# Patient Record
Sex: FEMALE | Race: WHITE | NOT HISPANIC OR LATINO | URBAN - METROPOLITAN AREA
[De-identification: names, ages, dates, MRNs, and addresses within clinical notes are randomized per-mention and may not be internally consistent; named-entity substitution may affect disease eponyms.]

---

## 2023-05-25 ENCOUNTER — APPOINTMENT (OUTPATIENT)
Dept: PRIMARY CARE | Facility: CLINIC | Age: 38
End: 2023-05-25
Payer: COMMERCIAL

## 2024-06-26 ENCOUNTER — APPOINTMENT (OUTPATIENT)
Dept: PRIMARY CARE | Facility: CLINIC | Age: 39
End: 2024-06-26
Payer: COMMERCIAL

## 2024-06-26 VITALS
BODY MASS INDEX: 43.82 KG/M2 | HEIGHT: 65 IN | WEIGHT: 263 LBS | HEART RATE: 86 BPM | SYSTOLIC BLOOD PRESSURE: 120 MMHG | OXYGEN SATURATION: 98 % | DIASTOLIC BLOOD PRESSURE: 80 MMHG

## 2024-06-26 DIAGNOSIS — G47.30 SLEEP APNEA, UNSPECIFIED TYPE: ICD-10-CM

## 2024-06-26 DIAGNOSIS — R51.9 WORSENING HEADACHES: ICD-10-CM

## 2024-06-26 DIAGNOSIS — R51.9 ACHING HEADACHE: ICD-10-CM

## 2024-06-26 DIAGNOSIS — Z00.00 ENCOUNTER FOR ANNUAL HEALTH EXAMINATION: ICD-10-CM

## 2024-06-26 DIAGNOSIS — G43.709 CHRONIC MIGRAINE WITHOUT AURA WITHOUT STATUS MIGRAINOSUS, NOT INTRACTABLE: ICD-10-CM

## 2024-06-26 DIAGNOSIS — R06.83 SNORING: ICD-10-CM

## 2024-06-26 DIAGNOSIS — T78.40XA ALLERGY, INITIAL ENCOUNTER: ICD-10-CM

## 2024-06-26 DIAGNOSIS — H53.9 VISUAL CHANGES: ICD-10-CM

## 2024-06-26 DIAGNOSIS — R40.0 HAS DAYTIME DROWSINESS: ICD-10-CM

## 2024-06-26 DIAGNOSIS — Z01.419 ENCOUNTER FOR GYNECOLOGICAL EXAMINATION: Primary | ICD-10-CM

## 2024-06-26 LAB
25(OH)D3 SERPL-MCNC: 23 NG/ML (ref 30–100)
ALBUMIN SERPL BCP-MCNC: 4.6 G/DL (ref 3.4–5)
ALP SERPL-CCNC: 85 U/L (ref 33–110)
ALT SERPL W P-5'-P-CCNC: 70 U/L (ref 7–45)
ANION GAP SERPL CALC-SCNC: 15 MMOL/L (ref 10–20)
AST SERPL W P-5'-P-CCNC: 33 U/L (ref 9–39)
BILIRUB SERPL-MCNC: 0.6 MG/DL (ref 0–1.2)
BUN SERPL-MCNC: 14 MG/DL (ref 6–23)
CALCIUM SERPL-MCNC: 9.3 MG/DL (ref 8.6–10.6)
CHLORIDE SERPL-SCNC: 102 MMOL/L (ref 98–107)
CHOLEST SERPL-MCNC: 180 MG/DL (ref 0–199)
CHOLESTEROL/HDL RATIO: 4.2
CO2 SERPL-SCNC: 23 MMOL/L (ref 21–32)
CREAT SERPL-MCNC: 0.71 MG/DL (ref 0.5–1.05)
EGFRCR SERPLBLD CKD-EPI 2021: >90 ML/MIN/1.73M*2
ERYTHROCYTE [DISTWIDTH] IN BLOOD BY AUTOMATED COUNT: 13.7 % (ref 11.5–14.5)
EST. AVERAGE GLUCOSE BLD GHB EST-MCNC: 105 MG/DL
GLUCOSE SERPL-MCNC: 79 MG/DL (ref 74–99)
HBA1C MFR BLD: 5.3 %
HCT VFR BLD AUTO: 41 % (ref 36–46)
HDLC SERPL-MCNC: 42.8 MG/DL
HGB BLD-MCNC: 13.4 G/DL (ref 12–16)
LDLC SERPL CALC-MCNC: 110 MG/DL
MAGNESIUM SERPL-MCNC: 2.18 MG/DL (ref 1.6–2.4)
MCH RBC QN AUTO: 28.3 PG (ref 26–34)
MCHC RBC AUTO-ENTMCNC: 32.7 G/DL (ref 32–36)
MCV RBC AUTO: 87 FL (ref 80–100)
NON HDL CHOLESTEROL: 137 MG/DL (ref 0–149)
NRBC BLD-RTO: 0 /100 WBCS (ref 0–0)
PHOSPHATE SERPL-MCNC: 3.3 MG/DL (ref 2.5–4.9)
PLATELET # BLD AUTO: 213 X10*3/UL (ref 150–450)
POTASSIUM SERPL-SCNC: 4 MMOL/L (ref 3.5–5.3)
PROT SERPL-MCNC: 7.3 G/DL (ref 6.4–8.2)
RBC # BLD AUTO: 4.73 X10*6/UL (ref 4–5.2)
SODIUM SERPL-SCNC: 136 MMOL/L (ref 136–145)
T3 SERPL-MCNC: 112 NG/DL (ref 60–200)
T4 SERPL-MCNC: 7.7 UG/DL (ref 4.5–11.1)
TRIGL SERPL-MCNC: 136 MG/DL (ref 0–149)
TSH SERPL-ACNC: 2.83 MIU/L (ref 0.44–3.98)
VLDL: 27 MG/DL (ref 0–40)
WBC # BLD AUTO: 9.5 X10*3/UL (ref 4.4–11.3)

## 2024-06-26 PROCEDURE — 80061 LIPID PANEL: CPT

## 2024-06-26 PROCEDURE — 84480 ASSAY TRIIODOTHYRONINE (T3): CPT

## 2024-06-26 PROCEDURE — 36415 COLL VENOUS BLD VENIPUNCTURE: CPT

## 2024-06-26 PROCEDURE — 82306 VITAMIN D 25 HYDROXY: CPT

## 2024-06-26 PROCEDURE — 80053 COMPREHEN METABOLIC PANEL: CPT

## 2024-06-26 PROCEDURE — 83735 ASSAY OF MAGNESIUM: CPT

## 2024-06-26 PROCEDURE — 84100 ASSAY OF PHOSPHORUS: CPT

## 2024-06-26 PROCEDURE — 1036F TOBACCO NON-USER: CPT | Performed by: NURSE PRACTITIONER

## 2024-06-26 PROCEDURE — 85027 COMPLETE CBC AUTOMATED: CPT

## 2024-06-26 PROCEDURE — 83036 HEMOGLOBIN GLYCOSYLATED A1C: CPT

## 2024-06-26 PROCEDURE — 84436 ASSAY OF TOTAL THYROXINE: CPT

## 2024-06-26 PROCEDURE — 99385 PREV VISIT NEW AGE 18-39: CPT | Performed by: NURSE PRACTITIONER

## 2024-06-26 PROCEDURE — 84443 ASSAY THYROID STIM HORMONE: CPT

## 2024-06-26 RX ORDER — FLUTICASONE PROPIONATE 50 MCG
1 SPRAY, SUSPENSION (ML) NASAL DAILY
Qty: 16 G | Refills: 5 | Status: SHIPPED | OUTPATIENT
Start: 2024-06-26 | End: 2025-06-26

## 2024-06-26 RX ORDER — LORATADINE 10 MG/1
10 TABLET ORAL DAILY
Qty: 30 TABLET | Refills: 2 | Status: SHIPPED | OUTPATIENT
Start: 2024-06-26 | End: 2024-06-27

## 2024-06-26 RX ORDER — DEXAMETHASONE 4 MG/1
4 TABLET ORAL 2 TIMES DAILY
Qty: 8 TABLET | Refills: 0 | Status: SHIPPED | OUTPATIENT
Start: 2024-06-26 | End: 2024-06-30

## 2024-06-26 ASSESSMENT — ENCOUNTER SYMPTOMS: DEPRESSION: 0

## 2024-06-26 NOTE — PROGRESS NOTES
"Subjective   Patient ID: Stephanie Paige is a 39 y.o. female who presents for Establish Care, Headache, and Snoring.    HPI     Stephanie is here to [facility].    Patient would like to talk about worsening headaches.  She does experience migraines with aura aura at times.  No nausea or food sensitivities.  She has had them for several years.  They are getting worse.  Does complain of left ear pain pressure And congestion around her left eye.  No fevers or chills.  No problems with moving bowels or bladder.  Needs an eye exam soon.  Of note patient has had a consistent headache with aura for several days.  Should receive a CT scan of her brain for follow-up.    She also reports excessive daytime sleepiness.  Her  states that she snores loud.  She states her  also has noted that he is unsure if she has breathing or not during her sleep?  Should obtain sleep study for further insight.    Needs referral for GYN  Drinks/smokes occasionally.  Works as a  at the [employer].   no kids.      PMH: migraines   -- used to be on  tompax by previous neurologist: stopped     Past family hx:  - Grandmother: breast Ca( age 40s)  - Mother brain tumor       Review of Systems    Objective   /80 (BP Location: Left arm, Patient Position: Sitting, BP Cuff Size: Adult)   Pulse 86   Ht 1.64 m (5' 4.58\")   Wt 119 kg (263 lb)   SpO2 98%   BMI 44.34 kg/m²     Physical Exam    Assessment/Plan   1. Encounter for gynecological examination  - Referral to Gynecology; Future    2. Encounter for annual health examination  - Hemoglobin A1C  - CBC  - Comprehensive Metabolic Panel  - Lipid Panel  - TSH with reflex to Free T4 if abnormal  - Triiodothyronine, Total  - T4  - Vitamin D 25-Hydroxy,Total (for eval of Vitamin D levels)    3. Aching headache    - Vitamin D 25-Hydroxy,Total (for eval of Vitamin D levels)  - Phosphorus  - Magnesium  - CT head wo IV contrast; Future  - ubrogepant (Ubrelvy) 100 " mg tablet tablet; Take 1 tablet (100 mg) by mouth once daily as needed (migraines). LOT [id number]  EXP 01/2026  Dispense: 2 tablet; Refill: 0  - rimegepant (Nurtec ODT) 75 mg tablet,disintegrating; Take 1 tablet (75 mg) by mouth once daily as needed (migraines). LOT [id number]  EXP 03/2026  Dispense: 2 tablet; Refill: 0  - dexAMETHasone (Decadron) 4 mg tablet; Take 1 tablet (4 mg) by mouth 2 times a day for 4 days.  Dispense: 8 tablet; Refill: 0    4. Visual changes  - CT head wo IV contrast; Future    5. Worsening headaches  - CT head wo IV contrast; Future  - dexAMETHasone (Decadron) 4 mg tablet; Take 1 tablet (4 mg) by mouth 2 times a day for 4 days.  Dispense: 8 tablet; Refill: 0    6. Chronic migraine without aura without status migrainosus, not intractable    - ubrogepant (Ubrelvy) 100 mg tablet tablet; Take 1 tablet (100 mg) by mouth once daily as needed (migraines). LOT [id number]  EXP 01/2026  Dispense: 2 tablet; Refill: 0  - rimegepant (Nurtec ODT) 75 mg tablet,disintegrating; Take 1 tablet (75 mg) by mouth once daily as needed (migraines). LOT [id number]  EXP 03/2026  Dispense: 2 tablet; Refill: 0    7. Has daytime drowsiness  - Home sleep apnea test (HSAT); Future    8. Snoring  - Home sleep apnea test (HSAT); Future    9. Sleep apnea, unspecified type  - Home sleep apnea test (HSAT); Future    10. Allergy, initial encounter  - loratadine (Claritin) 10 mg tablet; Take 1 tablet (10 mg) by mouth once daily.  Dispense: 30 tablet; Refill: 2  - fluticasone (Flonase) 50 mcg/actuation nasal spray; Administer 1 spray into each nostril once daily. Shake gently. Before first use, prime pump. After use, clean tip and replace cap.  Dispense: 16 g; Refill: 5       1 month FU for headaches

## 2024-06-27 DIAGNOSIS — R74.01 ELEVATED AST (SGOT): Primary | ICD-10-CM

## 2024-06-27 DIAGNOSIS — E55.9 VITAMIN D DEFICIENCY: ICD-10-CM

## 2024-06-27 RX ORDER — ERGOCALCIFEROL 1.25 MG/1
50000 CAPSULE ORAL
Qty: 4 CAPSULE | Refills: 1 | Status: SHIPPED | OUTPATIENT
Start: 2024-06-30 | End: 2024-08-25

## 2024-06-27 RX ORDER — LORATADINE 10 MG/1
10 TABLET ORAL DAILY
Qty: 90 TABLET | Refills: 3 | Status: SHIPPED | OUTPATIENT
Start: 2024-06-27

## 2024-07-01 ENCOUNTER — HOSPITAL ENCOUNTER (OUTPATIENT)
Dept: RADIOLOGY | Facility: HOSPITAL | Age: 39
Discharge: HOME | End: 2024-07-01
Payer: COMMERCIAL

## 2024-07-01 DIAGNOSIS — R51.9 WORSENING HEADACHES: ICD-10-CM

## 2024-07-01 DIAGNOSIS — R51.9 ACHING HEADACHE: ICD-10-CM

## 2024-07-01 DIAGNOSIS — H53.9 VISUAL CHANGES: ICD-10-CM

## 2024-07-01 PROCEDURE — 70450 CT HEAD/BRAIN W/O DYE: CPT

## 2024-07-01 PROCEDURE — 70450 CT HEAD/BRAIN W/O DYE: CPT | Performed by: RADIOLOGY

## 2024-07-02 ENCOUNTER — APPOINTMENT (OUTPATIENT)
Dept: RADIOLOGY | Facility: HOSPITAL | Age: 39
End: 2024-07-02
Payer: COMMERCIAL

## 2024-07-02 DIAGNOSIS — R74.01 ELEVATED AST (SGOT): ICD-10-CM

## 2024-07-02 PROCEDURE — 76705 ECHO EXAM OF ABDOMEN: CPT

## 2024-07-02 PROCEDURE — 76705 ECHO EXAM OF ABDOMEN: CPT | Performed by: RADIOLOGY

## 2024-07-03 ENCOUNTER — TELEPHONE (OUTPATIENT)
Dept: PRIMARY CARE | Facility: CLINIC | Age: 39
End: 2024-07-03
Payer: COMMERCIAL

## 2024-07-03 NOTE — TELEPHONE ENCOUNTER
Result Communication    No results found from the In Basket message.    5:19 PM  Pt called by this NP   VM left  Mychart message sent as well to discuss US results

## 2024-07-29 ENCOUNTER — PROCEDURE VISIT (OUTPATIENT)
Dept: SLEEP MEDICINE | Facility: CLINIC | Age: 39
End: 2024-07-29
Payer: COMMERCIAL

## 2024-07-29 ENCOUNTER — APPOINTMENT (OUTPATIENT)
Dept: PRIMARY CARE | Facility: CLINIC | Age: 39
End: 2024-07-29
Payer: COMMERCIAL

## 2024-07-29 VITALS
BODY MASS INDEX: 45.41 KG/M2 | HEIGHT: 64 IN | DIASTOLIC BLOOD PRESSURE: 86 MMHG | HEART RATE: 101 BPM | WEIGHT: 266 LBS | SYSTOLIC BLOOD PRESSURE: 124 MMHG | OXYGEN SATURATION: 98 %

## 2024-07-29 DIAGNOSIS — R74.8 ELEVATED LIVER ENZYMES: ICD-10-CM

## 2024-07-29 DIAGNOSIS — R06.83 SNORING: ICD-10-CM

## 2024-07-29 DIAGNOSIS — G43.709 CHRONIC MIGRAINE WITHOUT AURA WITHOUT STATUS MIGRAINOSUS, NOT INTRACTABLE: Primary | ICD-10-CM

## 2024-07-29 DIAGNOSIS — G47.33 OBSTRUCTIVE SLEEP APNEA (ADULT) (PEDIATRIC): ICD-10-CM

## 2024-07-29 DIAGNOSIS — G47.30 SLEEP APNEA, UNSPECIFIED TYPE: ICD-10-CM

## 2024-07-29 DIAGNOSIS — R40.0 HAS DAYTIME DROWSINESS: ICD-10-CM

## 2024-07-29 DIAGNOSIS — R21 RASH: ICD-10-CM

## 2024-07-29 PROCEDURE — 1036F TOBACCO NON-USER: CPT | Performed by: NURSE PRACTITIONER

## 2024-07-29 PROCEDURE — 99213 OFFICE O/P EST LOW 20 MIN: CPT | Performed by: NURSE PRACTITIONER

## 2024-07-29 PROCEDURE — 95806 SLEEP STUDY UNATT&RESP EFFT: CPT | Performed by: STUDENT IN AN ORGANIZED HEALTH CARE EDUCATION/TRAINING PROGRAM

## 2024-07-29 PROCEDURE — 3008F BODY MASS INDEX DOCD: CPT | Performed by: NURSE PRACTITIONER

## 2024-07-29 RX ORDER — TOPIRAMATE 25 MG/1
TABLET ORAL
Qty: 100 TABLET | Refills: 5 | Status: SHIPPED | OUTPATIENT
Start: 2024-07-29

## 2024-07-29 RX ORDER — CLOBETASOL PROPIONATE 0.5 MG/G
CREAM TOPICAL 2 TIMES DAILY
Qty: 15 G | Refills: 0 | Status: SHIPPED | OUTPATIENT
Start: 2024-07-29 | End: 2025-03-26

## 2024-07-29 ASSESSMENT — ENCOUNTER SYMPTOMS
MUSCULOSKELETAL NEGATIVE: 1
POLYPHAGIA: 0
HEMATOLOGIC/LYMPHATIC NEGATIVE: 1
SLEEP DISTURBANCE: 0
ALLERGIC/IMMUNOLOGIC NEGATIVE: 1
CONSTITUTIONAL NEGATIVE: 1
CONFUSION: 0
RESPIRATORY NEGATIVE: 1
LIGHT-HEADEDNESS: 0
GASTROINTESTINAL NEGATIVE: 1
DEPRESSION: 0
FACIAL ASYMMETRY: 0
NERVOUS/ANXIOUS: 0
EYES NEGATIVE: 1
CARDIOVASCULAR NEGATIVE: 1
HEADACHES: 1
WOUND: 0
PSYCHIATRIC NEGATIVE: 1
DIZZINESS: 0
ENDOCRINE NEGATIVE: 1
WEAKNESS: 0

## 2024-07-29 NOTE — PROGRESS NOTES
"Subjective   Patient ID: Stephanie Paige is a 39 y.o. female who presents for Follow-up (1 month follow up for headaches, they have not changed still the same ).    HPI   Stephanie is here for headache follow up   Still complaints of migraines wouldl like to back on Tompax    Decadron worked   Working on low fat diet     Review of Systems   Constitutional: Negative.    HENT: Negative.     Eyes: Negative.    Respiratory: Negative.     Cardiovascular: Negative.    Gastrointestinal: Negative.    Endocrine: Negative.  Negative for polyphagia.   Genitourinary: Negative.    Musculoskeletal: Negative.    Skin: Negative.  Negative for pallor, rash and wound.   Allergic/Immunologic: Negative.    Neurological:  Positive for headaches. Negative for dizziness, facial asymmetry, weakness and light-headedness.   Hematological: Negative.    Psychiatric/Behavioral: Negative.  Negative for confusion, sleep disturbance and suicidal ideas. The patient is not nervous/anxious.    All other systems reviewed and are negative.      Objective   /86   Pulse 101   Ht 1.626 m (5' 4\")   Wt 121 kg (266 lb)   SpO2 98%   BMI 45.66 kg/m²     Physical Exam  Vitals and nursing note reviewed.   Constitutional:       Appearance: Normal appearance. She is normal weight.   HENT:      Head: Normocephalic.      Nose: Nose normal.      Mouth/Throat:      Mouth: Mucous membranes are moist.   Eyes:      Extraocular Movements: Extraocular movements intact.      Conjunctiva/sclera: Conjunctivae normal.      Pupils: Pupils are equal, round, and reactive to light.   Cardiovascular:      Rate and Rhythm: Normal rate and regular rhythm.      Pulses: Normal pulses.      Heart sounds: Normal heart sounds.   Pulmonary:      Effort: Pulmonary effort is normal.   Abdominal:      General: Abdomen is flat. Bowel sounds are normal.      Palpations: Abdomen is soft.   Musculoskeletal:         General: Normal range of motion.      Cervical back: Normal range of " motion.   Skin:     General: Skin is warm and dry.   Neurological:      General: No focal deficit present.      Mental Status: She is alert and oriented to person, place, and time.      Cranial Nerves: No cranial nerve deficit.      Sensory: No sensory deficit.      Motor: No weakness.      Coordination: Coordination normal.      Gait: Gait normal.      Deep Tendon Reflexes: Reflexes normal.   Psychiatric:         Mood and Affect: Mood normal.         Behavior: Behavior normal.         Assessment/Plan   1. Chronic migraine without aura without status migrainosus, not intractable  - topiramate (Topamax) 25 mg tablet; Take 25 mg daily x 7 days, Then increase 25 mg two times daily x 7 days ,Then increase to 50 mg two times daily  Dispense: 100 tablet; Refill: 5    2. Rash    - clobetasol (Temovate) 0.05 % cream; Apply topically 2 times a day.  Dispense: 15 g; Refill: 0    3. Elevated liver enzymes  - Comprehensive metabolic panel; Future           Yearly exams or sooner if problems arise

## 2024-07-29 NOTE — PROGRESS NOTES
Type of Study: HOME SLEEP STUDY - NOMAD     The patient received equipment and instructions for use of the [facility] Nomad HSAT device. The patient was instructed how to apply the effort belts, cannula, thermistor. It was also explained how the Nomad and oximeter components work.  The patient was asked to record their sleep for an 8-hour period.     The patient was informed of their responsibility for the device and acknowledged this by signing the HSAT device contract. The patient was asked to return the device on 07/30/2024 between the hours of 06:30 am - 06:30 pm to the [facility].     The patient was instructed to call 911 as usual for any medical- emergencies while at home.  The patient was also given a phone number for troubleshooting when using the device in case there were additional questions.

## 2024-09-16 ENCOUNTER — LAB (OUTPATIENT)
Dept: LAB | Facility: LAB | Age: 39
End: 2024-09-16
Payer: COMMERCIAL

## 2024-09-16 DIAGNOSIS — R74.8 ELEVATED LIVER ENZYMES: ICD-10-CM

## 2024-09-16 LAB
ALBUMIN SERPL BCP-MCNC: 4.3 G/DL (ref 3.4–5)
ALP SERPL-CCNC: 75 U/L (ref 33–110)
ALT SERPL W P-5'-P-CCNC: 48 U/L (ref 7–45)
ANION GAP SERPL CALC-SCNC: 10 MMOL/L (ref 10–20)
AST SERPL W P-5'-P-CCNC: 22 U/L (ref 9–39)
BILIRUB SERPL-MCNC: 0.4 MG/DL (ref 0–1.2)
BUN SERPL-MCNC: 18 MG/DL (ref 6–23)
CALCIUM SERPL-MCNC: 9 MG/DL (ref 8.6–10.3)
CHLORIDE SERPL-SCNC: 107 MMOL/L (ref 98–107)
CO2 SERPL-SCNC: 24 MMOL/L (ref 21–32)
CREAT SERPL-MCNC: 0.97 MG/DL (ref 0.5–1.05)
EGFRCR SERPLBLD CKD-EPI 2021: 77 ML/MIN/1.73M*2
GLUCOSE SERPL-MCNC: 107 MG/DL (ref 74–99)
POTASSIUM SERPL-SCNC: 4.3 MMOL/L (ref 3.5–5.3)
PROT SERPL-MCNC: 7.1 G/DL (ref 6.4–8.2)
SODIUM SERPL-SCNC: 137 MMOL/L (ref 136–145)

## 2024-09-16 PROCEDURE — 80053 COMPREHEN METABOLIC PANEL: CPT

## 2024-09-16 PROCEDURE — 36415 COLL VENOUS BLD VENIPUNCTURE: CPT

## 2024-11-08 DIAGNOSIS — F41.9 ANXIETY: Primary | ICD-10-CM

## 2024-11-08 RX ORDER — SERTRALINE HYDROCHLORIDE 25 MG/1
25 TABLET, FILM COATED ORAL DAILY
Qty: 30 TABLET | Refills: 5 | Status: SHIPPED | OUTPATIENT
Start: 2024-11-08 | End: 2025-05-07

## 2024-11-08 RX ORDER — BUSPIRONE HYDROCHLORIDE 5 MG/1
5 TABLET ORAL 2 TIMES DAILY
Qty: 60 TABLET | Refills: 11 | Status: SHIPPED | OUTPATIENT
Start: 2024-11-08 | End: 2025-11-08

## 2025-01-23 DIAGNOSIS — G43.709 CHRONIC MIGRAINE WITHOUT AURA WITHOUT STATUS MIGRAINOSUS, NOT INTRACTABLE: Primary | ICD-10-CM

## 2025-01-23 RX ORDER — TOPIRAMATE 50 MG/1
50 TABLET, FILM COATED ORAL 2 TIMES DAILY
Qty: 180 TABLET | Refills: 3 | Status: SHIPPED | OUTPATIENT
Start: 2025-01-23 | End: 2026-01-23

## 2025-02-06 ENCOUNTER — HOSPITAL ENCOUNTER (INPATIENT)
Facility: HOSPITAL | Age: 40
LOS: 1 days | Discharge: HOME | End: 2025-02-07
Attending: EMERGENCY MEDICINE | Admitting: STUDENT IN AN ORGANIZED HEALTH CARE EDUCATION/TRAINING PROGRAM
Payer: COMMERCIAL

## 2025-02-06 ENCOUNTER — APPOINTMENT (OUTPATIENT)
Dept: RADIOLOGY | Facility: HOSPITAL | Age: 40
End: 2025-02-06
Payer: COMMERCIAL

## 2025-02-06 ENCOUNTER — APPOINTMENT (OUTPATIENT)
Dept: CARDIOLOGY | Facility: HOSPITAL | Age: 40
End: 2025-02-06
Payer: COMMERCIAL

## 2025-02-06 DIAGNOSIS — J15.9 COMMUNITY ACQUIRED BACTERIAL PNEUMONIA: Primary | ICD-10-CM

## 2025-02-06 DIAGNOSIS — J18.9 COMMUNITY ACQUIRED PNEUMONIA OF LEFT LOWER LOBE OF LUNG: ICD-10-CM

## 2025-02-06 DIAGNOSIS — R91.1 LUNG NODULE: ICD-10-CM

## 2025-02-06 DIAGNOSIS — R00.0 TACHYCARDIA: ICD-10-CM

## 2025-02-06 LAB
ALBUMIN SERPL BCP-MCNC: 4.3 G/DL (ref 3.4–5)
ALP SERPL-CCNC: 93 U/L (ref 33–110)
ALT SERPL W P-5'-P-CCNC: 28 U/L (ref 7–45)
ANION GAP SERPL CALC-SCNC: 11 MMOL/L (ref 10–20)
AST SERPL W P-5'-P-CCNC: 17 U/L (ref 9–39)
B-HCG SERPL-ACNC: <2 MIU/ML
BASOPHILS # BLD AUTO: 0.03 X10*3/UL (ref 0–0.1)
BASOPHILS NFR BLD AUTO: 0.2 %
BILIRUB SERPL-MCNC: 0.5 MG/DL (ref 0–1.2)
BUN SERPL-MCNC: 12 MG/DL (ref 6–23)
CALCIUM SERPL-MCNC: 9 MG/DL (ref 8.6–10.3)
CARDIAC TROPONIN I PNL SERPL HS: <3 NG/L (ref 0–13)
CHLORIDE SERPL-SCNC: 105 MMOL/L (ref 98–107)
CO2 SERPL-SCNC: 22 MMOL/L (ref 21–32)
CREAT SERPL-MCNC: 0.9 MG/DL (ref 0.5–1.05)
D DIMER PPP FEU-MCNC: 385 NG/ML FEU
EGFRCR SERPLBLD CKD-EPI 2021: 85 ML/MIN/1.73M*2
EOSINOPHIL # BLD AUTO: 0.05 X10*3/UL (ref 0–0.7)
EOSINOPHIL NFR BLD AUTO: 0.4 %
ERYTHROCYTE [DISTWIDTH] IN BLOOD BY AUTOMATED COUNT: 12.8 % (ref 11.5–14.5)
FLUAV RNA RESP QL NAA+PROBE: NOT DETECTED
FLUBV RNA RESP QL NAA+PROBE: NOT DETECTED
GLUCOSE SERPL-MCNC: 112 MG/DL (ref 74–99)
HCT VFR BLD AUTO: 41.1 % (ref 36–46)
HGB BLD-MCNC: 13.8 G/DL (ref 12–16)
IMM GRANULOCYTES # BLD AUTO: 0.06 X10*3/UL (ref 0–0.7)
IMM GRANULOCYTES NFR BLD AUTO: 0.4 % (ref 0–0.9)
LYMPHOCYTES # BLD AUTO: 1.15 X10*3/UL (ref 1.2–4.8)
LYMPHOCYTES NFR BLD AUTO: 8.3 %
MCH RBC QN AUTO: 28.8 PG (ref 26–34)
MCHC RBC AUTO-ENTMCNC: 33.6 G/DL (ref 32–36)
MCV RBC AUTO: 86 FL (ref 80–100)
MONOCYTES # BLD AUTO: 0.7 X10*3/UL (ref 0.1–1)
MONOCYTES NFR BLD AUTO: 5 %
NEUTROPHILS # BLD AUTO: 11.9 X10*3/UL (ref 1.2–7.7)
NEUTROPHILS NFR BLD AUTO: 85.7 %
NRBC BLD-RTO: 0 /100 WBCS (ref 0–0)
PLATELET # BLD AUTO: 197 X10*3/UL (ref 150–450)
POTASSIUM SERPL-SCNC: 3.9 MMOL/L (ref 3.5–5.3)
PROT SERPL-MCNC: 7.7 G/DL (ref 6.4–8.2)
RBC # BLD AUTO: 4.79 X10*6/UL (ref 4–5.2)
SARS-COV-2 RNA RESP QL NAA+PROBE: NOT DETECTED
SODIUM SERPL-SCNC: 134 MMOL/L (ref 136–145)
WBC # BLD AUTO: 13.9 X10*3/UL (ref 4.4–11.3)

## 2025-02-06 PROCEDURE — 85025 COMPLETE CBC W/AUTO DIFF WBC: CPT | Performed by: EMERGENCY MEDICINE

## 2025-02-06 PROCEDURE — 99285 EMERGENCY DEPT VISIT HI MDM: CPT | Mod: 25 | Performed by: EMERGENCY MEDICINE

## 2025-02-06 PROCEDURE — 2500000001 HC RX 250 WO HCPCS SELF ADMINISTERED DRUGS (ALT 637 FOR MEDICARE OP): Performed by: PHYSICIAN ASSISTANT

## 2025-02-06 PROCEDURE — 2500000004 HC RX 250 GENERAL PHARMACY W/ HCPCS (ALT 636 FOR OP/ED): Performed by: EMERGENCY MEDICINE

## 2025-02-06 PROCEDURE — 96365 THER/PROPH/DIAG IV INF INIT: CPT

## 2025-02-06 PROCEDURE — 71275 CT ANGIOGRAPHY CHEST: CPT

## 2025-02-06 PROCEDURE — 87636 SARSCOV2 & INF A&B AMP PRB: CPT | Performed by: EMERGENCY MEDICINE

## 2025-02-06 PROCEDURE — 96361 HYDRATE IV INFUSION ADD-ON: CPT

## 2025-02-06 PROCEDURE — 71046 X-RAY EXAM CHEST 2 VIEWS: CPT

## 2025-02-06 PROCEDURE — 71046 X-RAY EXAM CHEST 2 VIEWS: CPT | Performed by: STUDENT IN AN ORGANIZED HEALTH CARE EDUCATION/TRAINING PROGRAM

## 2025-02-06 PROCEDURE — 2550000001 HC RX 255 CONTRASTS: Performed by: EMERGENCY MEDICINE

## 2025-02-06 PROCEDURE — 36415 COLL VENOUS BLD VENIPUNCTURE: CPT | Performed by: EMERGENCY MEDICINE

## 2025-02-06 PROCEDURE — 84484 ASSAY OF TROPONIN QUANT: CPT | Performed by: EMERGENCY MEDICINE

## 2025-02-06 PROCEDURE — 85379 FIBRIN DEGRADATION QUANT: CPT | Performed by: EMERGENCY MEDICINE

## 2025-02-06 PROCEDURE — 80053 COMPREHEN METABOLIC PANEL: CPT | Performed by: EMERGENCY MEDICINE

## 2025-02-06 PROCEDURE — 87449 NOS EACH ORGANISM AG IA: CPT | Mod: PARLAB | Performed by: PHYSICIAN ASSISTANT

## 2025-02-06 PROCEDURE — 36415 COLL VENOUS BLD VENIPUNCTURE: CPT | Performed by: PHYSICIAN ASSISTANT

## 2025-02-06 PROCEDURE — 96367 TX/PROPH/DG ADDL SEQ IV INF: CPT

## 2025-02-06 PROCEDURE — 87899 AGENT NOS ASSAY W/OPTIC: CPT | Mod: PARLAB | Performed by: PHYSICIAN ASSISTANT

## 2025-02-06 PROCEDURE — 93005 ELECTROCARDIOGRAM TRACING: CPT

## 2025-02-06 PROCEDURE — 84702 CHORIONIC GONADOTROPIN TEST: CPT | Performed by: EMERGENCY MEDICINE

## 2025-02-06 PROCEDURE — G0378 HOSPITAL OBSERVATION PER HR: HCPCS

## 2025-02-06 PROCEDURE — 87040 BLOOD CULTURE FOR BACTERIA: CPT | Mod: PARLAB | Performed by: PHYSICIAN ASSISTANT

## 2025-02-06 PROCEDURE — 2500000001 HC RX 250 WO HCPCS SELF ADMINISTERED DRUGS (ALT 637 FOR MEDICARE OP): Performed by: EMERGENCY MEDICINE

## 2025-02-06 PROCEDURE — 71275 CT ANGIOGRAPHY CHEST: CPT | Mod: FOREIGN READ | Performed by: RADIOLOGY

## 2025-02-06 RX ORDER — ONDANSETRON 4 MG/1
4 TABLET, FILM COATED ORAL EVERY 8 HOURS PRN
Status: DISCONTINUED | OUTPATIENT
Start: 2025-02-06 | End: 2025-02-07 | Stop reason: HOSPADM

## 2025-02-06 RX ORDER — CETIRIZINE HYDROCHLORIDE 10 MG/1
10 TABLET ORAL DAILY
Status: DISCONTINUED | OUTPATIENT
Start: 2025-02-06 | End: 2025-02-07 | Stop reason: HOSPADM

## 2025-02-06 RX ORDER — BENZONATATE 100 MG/1
100 CAPSULE ORAL 3 TIMES DAILY PRN
Status: DISCONTINUED | OUTPATIENT
Start: 2025-02-06 | End: 2025-02-07 | Stop reason: HOSPADM

## 2025-02-06 RX ORDER — ACETAMINOPHEN 650 MG/1
650 SUPPOSITORY RECTAL EVERY 4 HOURS PRN
Status: DISCONTINUED | OUTPATIENT
Start: 2025-02-06 | End: 2025-02-07 | Stop reason: HOSPADM

## 2025-02-06 RX ORDER — CEFTRIAXONE 2 G/50ML
2 INJECTION, SOLUTION INTRAVENOUS EVERY 24 HOURS
Status: DISCONTINUED | OUTPATIENT
Start: 2025-02-07 | End: 2025-02-07 | Stop reason: HOSPADM

## 2025-02-06 RX ORDER — IPRATROPIUM BROMIDE AND ALBUTEROL SULFATE 2.5; .5 MG/3ML; MG/3ML
3 SOLUTION RESPIRATORY (INHALATION) EVERY 6 HOURS PRN
Status: DISCONTINUED | OUTPATIENT
Start: 2025-02-06 | End: 2025-02-07 | Stop reason: HOSPADM

## 2025-02-06 RX ORDER — ACETAMINOPHEN 325 MG/1
650 TABLET ORAL EVERY 4 HOURS PRN
Status: DISCONTINUED | OUTPATIENT
Start: 2025-02-06 | End: 2025-02-07 | Stop reason: HOSPADM

## 2025-02-06 RX ORDER — FLUTICASONE PROPIONATE 50 MCG
1 SPRAY, SUSPENSION (ML) NASAL DAILY PRN
Status: DISCONTINUED | OUTPATIENT
Start: 2025-02-06 | End: 2025-02-07 | Stop reason: HOSPADM

## 2025-02-06 RX ORDER — ONDANSETRON HYDROCHLORIDE 2 MG/ML
4 INJECTION, SOLUTION INTRAVENOUS EVERY 8 HOURS PRN
Status: DISCONTINUED | OUTPATIENT
Start: 2025-02-06 | End: 2025-02-07 | Stop reason: HOSPADM

## 2025-02-06 RX ORDER — TOPIRAMATE 25 MG/1
50 TABLET ORAL 2 TIMES DAILY
Status: DISCONTINUED | OUTPATIENT
Start: 2025-02-06 | End: 2025-02-07 | Stop reason: HOSPADM

## 2025-02-06 RX ORDER — BETAMETHASONE DIPROPIONATE 0.5 MG/G
CREAM TOPICAL 2 TIMES DAILY PRN
Status: DISCONTINUED | OUTPATIENT
Start: 2025-02-06 | End: 2025-02-07 | Stop reason: HOSPADM

## 2025-02-06 RX ORDER — BUSPIRONE HYDROCHLORIDE 5 MG/1
5 TABLET ORAL 2 TIMES DAILY PRN
Status: DISCONTINUED | OUTPATIENT
Start: 2025-02-06 | End: 2025-02-07 | Stop reason: HOSPADM

## 2025-02-06 RX ORDER — ACETAMINOPHEN 325 MG/1
650 TABLET ORAL ONCE
Status: COMPLETED | OUTPATIENT
Start: 2025-02-06 | End: 2025-02-06

## 2025-02-06 RX ORDER — ACETAMINOPHEN 160 MG/5ML
650 SOLUTION ORAL EVERY 4 HOURS PRN
Status: DISCONTINUED | OUTPATIENT
Start: 2025-02-06 | End: 2025-02-07 | Stop reason: HOSPADM

## 2025-02-06 RX ORDER — CEFTRIAXONE 1 G/50ML
1 INJECTION, SOLUTION INTRAVENOUS ONCE
Status: COMPLETED | OUTPATIENT
Start: 2025-02-06 | End: 2025-02-06

## 2025-02-06 RX ORDER — ACETAMINOPHEN 325 MG/1
975 TABLET ORAL ONCE
Status: COMPLETED | OUTPATIENT
Start: 2025-02-06 | End: 2025-02-06

## 2025-02-06 RX ORDER — CLOBETASOL PROPIONATE 0.5 MG/G
CREAM TOPICAL 2 TIMES DAILY PRN
Status: DISCONTINUED | OUTPATIENT
Start: 2025-02-06 | End: 2025-02-06

## 2025-02-06 RX ORDER — SERTRALINE HYDROCHLORIDE 25 MG/1
25 TABLET, FILM COATED ORAL DAILY
Status: DISCONTINUED | OUTPATIENT
Start: 2025-02-06 | End: 2025-02-07 | Stop reason: HOSPADM

## 2025-02-06 RX ADMIN — IOHEXOL 75 ML: 350 INJECTION, SOLUTION INTRAVENOUS at 13:18

## 2025-02-06 RX ADMIN — ACETAMINOPHEN 975 MG: 325 TABLET, FILM COATED ORAL at 16:31

## 2025-02-06 RX ADMIN — TOPIRAMATE 50 MG: 25 TABLET, FILM COATED ORAL at 21:34

## 2025-02-06 RX ADMIN — AZITHROMYCIN MONOHYDRATE 500 MG: 500 INJECTION, POWDER, LYOPHILIZED, FOR SOLUTION INTRAVENOUS at 16:42

## 2025-02-06 RX ADMIN — CEFTRIAXONE SODIUM 1 G: 1 INJECTION, SOLUTION INTRAVENOUS at 14:46

## 2025-02-06 RX ADMIN — SODIUM CHLORIDE 1000 ML: 9 INJECTION, SOLUTION INTRAVENOUS at 11:37

## 2025-02-06 RX ADMIN — ACETAMINOPHEN 650 MG: 325 TABLET, FILM COATED ORAL at 12:30

## 2025-02-06 RX ADMIN — ACETAMINOPHEN 650 MG: 325 TABLET, FILM COATED ORAL at 21:34

## 2025-02-06 SDOH — ECONOMIC STABILITY: INCOME INSECURITY: IN THE PAST 12 MONTHS HAS THE ELECTRIC, GAS, OIL, OR WATER COMPANY THREATENED TO SHUT OFF SERVICES IN YOUR HOME?: NO

## 2025-02-06 SDOH — ECONOMIC STABILITY: FOOD INSECURITY: WITHIN THE PAST 12 MONTHS, THE FOOD YOU BOUGHT JUST DIDN'T LAST AND YOU DIDN'T HAVE MONEY TO GET MORE.: NEVER TRUE

## 2025-02-06 SDOH — ECONOMIC STABILITY: FOOD INSECURITY: WITHIN THE PAST 12 MONTHS, YOU WORRIED THAT YOUR FOOD WOULD RUN OUT BEFORE YOU GOT MONEY TO BUY MORE.: NEVER TRUE

## 2025-02-06 SDOH — ECONOMIC STABILITY: INCOME INSECURITY: IN THE LAST 12 MONTHS, WAS THERE A TIME WHEN YOU WERE NOT ABLE TO PAY THE MORTGAGE OR RENT ON TIME?: NO

## 2025-02-06 SDOH — ECONOMIC STABILITY: FOOD INSECURITY

## 2025-02-06 SDOH — ECONOMIC STABILITY: FOOD INSECURITY: WITHIN THE PAST 12 MONTHS, YOU WORRIED THAT YOUR FOOD WOULD RUN OUT BEFORE YOU GOT THE MONEY TO BUY MORE.: NEVER TRUE

## 2025-02-06 SDOH — SOCIAL STABILITY: SOCIAL INSECURITY: ABUSE: ADULT

## 2025-02-06 SDOH — ECONOMIC STABILITY: TRANSPORTATION INSECURITY: IN THE PAST 12 MONTHS, HAS LACK OF TRANSPORTATION KEPT YOU FROM MEDICAL APPOINTMENTS OR FROM GETTING MEDICATIONS?: NO

## 2025-02-06 SDOH — SOCIAL STABILITY: SOCIAL INSECURITY
WITHIN THE LAST YEAR, HAVE YOU BEEN RAPED OR FORCED TO HAVE ANY KIND OF SEXUAL ACTIVITY BY YOUR PARTNER OR EX-PARTNER?: NO

## 2025-02-06 SDOH — ECONOMIC STABILITY: TRANSPORTATION INSECURITY
IN THE PAST 12 MONTHS, HAS LACK OF TRANSPORTATION KEPT YOU FROM MEETINGS, WORK, OR FROM GETTING THINGS NEEDED FOR DAILY LIVING?: NO

## 2025-02-06 SDOH — ECONOMIC STABILITY: HOUSING INSECURITY
IN THE LAST 12 MONTHS, WAS THERE A TIME WHEN YOU DID NOT HAVE A STEADY PLACE TO SLEEP OR SLEPT IN A SHELTER (INCLUDING NOW)?: NO

## 2025-02-06 SDOH — SOCIAL STABILITY: SOCIAL INSECURITY: WITHIN THE LAST YEAR, HAVE YOU BEEN HUMILIATED OR EMOTIONALLY ABUSED IN OTHER WAYS BY YOUR PARTNER OR EX-PARTNER?: NO

## 2025-02-06 SDOH — ECONOMIC STABILITY: HOUSING INSECURITY: IN THE PAST 12 MONTHS HAS THE ELECTRIC, GAS, OIL, OR WATER COMPANY THREATENED TO SHUT OFF SERVICES IN YOUR HOME?: NO

## 2025-02-06 SDOH — ECONOMIC STABILITY: TRANSPORTATION INSECURITY
IN THE PAST 12 MONTHS, HAS THE LACK OF TRANSPORTATION KEPT YOU FROM MEDICAL APPOINTMENTS OR FROM GETTING MEDICATIONS?: NO

## 2025-02-06 SDOH — SOCIAL STABILITY: SOCIAL INSECURITY: DO YOU FEEL ANYONE HAS EXPLOITED OR TAKEN ADVANTAGE OF YOU FINANCIALLY OR OF YOUR PERSONAL PROPERTY?: NO

## 2025-02-06 SDOH — SOCIAL STABILITY: SOCIAL INSECURITY: DOES ANYONE TRY TO KEEP YOU FROM HAVING/CONTACTING OTHER FRIENDS OR DOING THINGS OUTSIDE YOUR HOME?: NO

## 2025-02-06 SDOH — SOCIAL STABILITY: SOCIAL INSECURITY: ARE THERE ANY APPARENT SIGNS OF INJURIES/BEHAVIORS THAT COULD BE RELATED TO ABUSE/NEGLECT?: NO

## 2025-02-06 SDOH — SOCIAL STABILITY: SOCIAL INSECURITY: WITHIN THE LAST YEAR, HAVE YOU BEEN AFRAID OF YOUR PARTNER OR EX-PARTNER?: NO

## 2025-02-06 SDOH — ECONOMIC STABILITY: HOUSING INSECURITY: IN THE LAST 12 MONTHS, HOW MANY PLACES HAVE YOU LIVED?: 1

## 2025-02-06 SDOH — SOCIAL STABILITY: SOCIAL INSECURITY: DO YOU FEEL UNSAFE GOING BACK TO THE PLACE WHERE YOU ARE LIVING?: NO

## 2025-02-06 SDOH — ECONOMIC STABILITY: GENERAL

## 2025-02-06 SDOH — ECONOMIC STABILITY: HOUSING INSECURITY: IN THE LAST 12 MONTHS, WAS THERE A TIME WHEN YOU WERE NOT ABLE TO PAY THE MORTGAGE OR RENT ON TIME?: NO

## 2025-02-06 SDOH — SOCIAL STABILITY: SOCIAL INSECURITY
WITHIN THE LAST YEAR, HAVE YOU BEEN KICKED, HIT, SLAPPED, OR OTHERWISE PHYSICALLY HURT BY YOUR PARTNER OR EX-PARTNER?: NO

## 2025-02-06 SDOH — ECONOMIC STABILITY: HOUSING INSECURITY

## 2025-02-06 SDOH — SOCIAL STABILITY: SOCIAL INSECURITY: HAVE YOU HAD THOUGHTS OF HARMING ANYONE ELSE?: NO

## 2025-02-06 SDOH — SOCIAL STABILITY: SOCIAL INSECURITY: WERE YOU ABLE TO COMPLETE ALL THE BEHAVIORAL HEALTH SCREENINGS?: YES

## 2025-02-06 SDOH — ECONOMIC STABILITY: TRANSPORTATION INSECURITY

## 2025-02-06 SDOH — SOCIAL STABILITY: SOCIAL INSECURITY: HAS ANYONE EVER THREATENED TO HURT YOUR FAMILY OR YOUR PETS?: NO

## 2025-02-06 SDOH — SOCIAL STABILITY: SOCIAL INSECURITY: ARE YOU OR HAVE YOU BEEN THREATENED OR ABUSED PHYSICALLY, EMOTIONALLY, OR SEXUALLY BY ANYONE?: NO

## 2025-02-06 SDOH — SOCIAL STABILITY: SOCIAL INSECURITY: HAVE YOU HAD ANY THOUGHTS OF HARMING ANYONE ELSE?: NO

## 2025-02-06 ASSESSMENT — PAIN SCALES - GENERAL
PAINLEVEL_OUTOF10: 1
PAINLEVEL_OUTOF10: 3
PAINLEVEL_OUTOF10: 7
PAINLEVEL_OUTOF10: 3
PAINLEVEL_OUTOF10: 5 - MODERATE PAIN

## 2025-02-06 ASSESSMENT — COGNITIVE AND FUNCTIONAL STATUS - GENERAL
MOBILITY SCORE: 23
MOBILITY SCORE: 23
DAILY ACTIVITIY SCORE: 24
PATIENT BASELINE BEDBOUND: NO
DAILY ACTIVITIY SCORE: 24
CLIMB 3 TO 5 STEPS WITH RAILING: A LITTLE
CLIMB 3 TO 5 STEPS WITH RAILING: A LITTLE

## 2025-02-06 ASSESSMENT — COLUMBIA-SUICIDE SEVERITY RATING SCALE - C-SSRS

## 2025-02-06 ASSESSMENT — LIFESTYLE VARIABLES
AUDIT-C TOTAL SCORE: 0
HOW MANY STANDARD DRINKS CONTAINING ALCOHOL DO YOU HAVE ON A TYPICAL DAY: PATIENT DOES NOT DRINK
SUBSTANCE_ABUSE_PAST_12_MONTHS: NO
HAVE YOU EVER FELT YOU SHOULD CUT DOWN ON YOUR DRINKING: NO
HAVE PEOPLE ANNOYED YOU BY CRITICIZING YOUR DRINKING: NO
PRESCIPTION_ABUSE_PAST_12_MONTHS: NO
HOW OFTEN DO YOU HAVE 6 OR MORE DRINKS ON ONE OCCASION: NEVER
HOW OFTEN DO YOU HAVE A DRINK CONTAINING ALCOHOL: NEVER
SKIP TO QUESTIONS 9-10: 1
TOTAL SCORE: 0
EVER HAD A DRINK FIRST THING IN THE MORNING TO STEADY YOUR NERVES TO GET RID OF A HANGOVER: NO
EVER FELT BAD OR GUILTY ABOUT YOUR DRINKING: NO
AUDIT-C TOTAL SCORE: 0

## 2025-02-06 ASSESSMENT — ACTIVITIES OF DAILY LIVING (ADL)
WALKS IN HOME: INDEPENDENT
LACK_OF_TRANSPORTATION: NO
BATHING: INDEPENDENT
DRESSING YOURSELF: INDEPENDENT
ADEQUATE_TO_COMPLETE_ADL: YES
LACK_OF_TRANSPORTATION: NO
TOILETING: INDEPENDENT
FEEDING YOURSELF: INDEPENDENT
HEARING - RIGHT EAR: FUNCTIONAL
HEARING - LEFT EAR: FUNCTIONAL
JUDGMENT_ADEQUATE_SAFELY_COMPLETE_DAILY_ACTIVITIES: YES
PATIENT'S MEMORY ADEQUATE TO SAFELY COMPLETE DAILY ACTIVITIES?: YES
GROOMING: INDEPENDENT

## 2025-02-06 ASSESSMENT — PAIN - FUNCTIONAL ASSESSMENT
PAIN_FUNCTIONAL_ASSESSMENT: 0-10
PAIN_FUNCTIONAL_ASSESSMENT: 0-10

## 2025-02-06 ASSESSMENT — PAIN DESCRIPTION - DESCRIPTORS: DESCRIPTORS: DULL

## 2025-02-06 ASSESSMENT — PAIN DESCRIPTION - ORIENTATION: ORIENTATION: MID

## 2025-02-06 ASSESSMENT — PAIN DESCRIPTION - LOCATION
LOCATION: CHEST
LOCATION: THROAT

## 2025-02-06 ASSESSMENT — PATIENT HEALTH QUESTIONNAIRE - PHQ9
1. LITTLE INTEREST OR PLEASURE IN DOING THINGS: NOT AT ALL
2. FEELING DOWN, DEPRESSED OR HOPELESS: NOT AT ALL
SUM OF ALL RESPONSES TO PHQ9 QUESTIONS 1 & 2: 0

## 2025-02-06 ASSESSMENT — SOCIAL DETERMINANTS OF HEALTH (SDOH): IN THE PAST 12 MONTHS, HAS THE ELECTRIC, GAS, OIL, OR WATER COMPANY THREATENED TO SHUT OFF SERVICE IN YOUR HOME?: NO

## 2025-02-06 NOTE — ED PROVIDER NOTES
HPI   Chief Complaint   Patient presents with    Chest Pain       39-year-old female presents with chest pain.  She states she has a history of anxiety and she does get chest pain with her anxiety.  The pain is not pleuritic.  Not exertional.  It is a diffuse tight sensation.  No recent travel or immobilization.  She states that she has been under a lot of stress recently but also had a viral upper respiratory type illness.  Denies recent travel or immobilization.  Denies lower extremity pain or swelling.  Denies history of DVT or PE.  Denies family history of cardiac disease at a young age.  She does not smoke.              Patient History   Past Medical History:   Diagnosis Date    Allergic     Eczema     Headache     Streptococcal pharyngitis 06/28/2019    Pharyngitis due to group A beta hemolytic Streptococci     No past surgical history on file.  Family History   Problem Relation Name Age of Onset    Anesthesia related problems Mother Saundra Hinton     Cancer Mother Saundra Hinton     Diabetes Mother Saundra Hinton     Hypertension Mother Saundra Hinton     Stroke Mother Saundra Hinton     Hypertension Father Luis Hinton     Diabetes Paternal Grandfather Mack Hinton     Heart disease Paternal Grandfather Mack Hinton     Breast cancer Paternal Grandmother Stephani Hinton     Diabetes Paternal Grandmother Stephani Hinton     Cancer Mother's Brother Bari Clements      Social History     Tobacco Use    Smoking status: Never     Passive exposure: Yes    Smokeless tobacco: Never   Vaping Use    Vaping status: Never Used   Substance Use Topics    Alcohol use: Yes     Alcohol/week: 1.0 standard drink of alcohol     Types: 1 Standard drinks or equivalent per week    Drug use: Never       Physical Exam   ED Triage Vitals [02/06/25 1016]   Temperature Heart Rate Respirations BP   37.3 °C (99.1 °F) (!) 110 19 --      Pulse Ox Temp Source Heart Rate Source Patient Position   100 % Temporal Monitor --      BP Location  FiO2 (%)     Right arm --       Physical Exam  Vitals and nursing note reviewed.   Constitutional:       General: She is not in acute distress.     Appearance: She is well-developed.   HENT:      Head: Normocephalic and atraumatic.   Eyes:      Conjunctiva/sclera: Conjunctivae normal.   Cardiovascular:      Rate and Rhythm: Normal rate and regular rhythm.      Heart sounds: No murmur heard.  Pulmonary:      Effort: Pulmonary effort is normal. No respiratory distress.      Breath sounds: Normal breath sounds.   Abdominal:      Palpations: Abdomen is soft.      Tenderness: There is no abdominal tenderness.   Musculoskeletal:         General: No swelling.      Cervical back: Neck supple.   Skin:     General: Skin is warm and dry.      Capillary Refill: Capillary refill takes less than 2 seconds.   Neurological:      Mental Status: She is alert.   Psychiatric:         Mood and Affect: Mood normal.           ED Course & MDM   ED Course as of 02/06/25 1438   Thu Feb 06, 2025   1136 Diverticulitis.  Quantitative hCG negative.  CBC and chemistries negative except for slight leukocytosis.  Chest x-ray clear except for questionable lung nodule.  Will refer to lung nodule clinic.  Influenza and COVID are still pending.  Troponin negative. [CD]   1247 He is chest pain-free but still tachycardic in the low 100s.  She is negative for flu and COVID.  Her chest x-ray reveals a questionable infiltrate.  She has ongoing tachycardia.  I did give her 650 mg of Tylenol.  We rechecked her temperature and it is 97.7.  I suspect she does have an infection with her slight leukocytosis and low-grade fever.  Negative for flu and COVID.  After discussion with her, we elected to send her over for CT angiogram to further characterize her chest x-ray abnormalities. [CD]   1432 CT angiogram reveals a left lower lobe infiltrate.  She has a white count of 13.9.  She is persistently tachycardic in the 1 15-1 20 range despite fluids and Tylenol.  I  discussed the case with her primary care provider and we both agree it is safest to keep her as observation for IV antibiotics and monitoring overnight. [CD]      ED Course User Index  [CD] Jeff Sterling MD         Diagnoses as of 02/06/25 1438   Community acquired bacterial pneumonia   Tachycardia                 No data recorded     [place] Coma Scale Score: 15 (02/06/25 1145 : Brittany Carlson RN)                           Medical Decision Making  EKG interpreted by me.  No acute ischemic changes.  No ST elevation.  Rate is 110.  Normal QRS duration.        Procedure  Procedures     Jeff Sterling MD  02/06/25 1438

## 2025-02-06 NOTE — CARE PLAN
The patient's goals for the shift include      The clinical goals for the shift include        Problem: Pain - Adult  Goal: Verbalizes/displays adequate comfort level or baseline comfort level  Outcome: Progressing     Problem: Safety - Adult  Goal: Free from fall injury  Outcome: Progressing     Problem: Discharge Planning  Goal: Discharge to home or other facility with appropriate resources  Outcome: Progressing     Problem: Chronic Conditions and Co-morbidities  Goal: Patient's chronic conditions and co-morbidity symptoms are monitored and maintained or improved  Outcome: Progressing     Problem: Nutrition  Goal: Nutrient intake appropriate for maintaining nutritional needs  Outcome: Progressing

## 2025-02-06 NOTE — H&P
History Of Present Illness  Stephanie Paige is a 39 y.o. female with past medical history significant for obesity and anxiety who presents to the ED with complaints of a chest heaviness.  Describes it as a tight sensation in the middle of her upper chest, near her sternum.  Describes it as a pressure-like sensation where it feels like someone is sitting on the middle of her chest.  Denies any stabbing pains.  Denies any radiation of pain to her neck, face, back, or upper extremities.  Denies any recent travel or immobilization.  Denies any signs of swelling.  Denies history of DVT/PE.  Denies smoking.  Does admit to occasional chest pressure with anxiety in the past, however states this has been going on for the past couple weeks and feels like it is getting worse.  Does report increased stress recently and also a viral upper respiratory type illness that she has been dealing with.  Reports nonproductive cough, mild intermittent headache, and congestion.  Has not taken any medications at home for her symptoms.  Denies any shortness of breath.  Denies palpitations.  States she works in a  and is always around sick kids.  Denies dizziness, lightheadedness, fevers or chills, urinary/bowel changes, abdominal pain, nausea, vomiting, or weakness/difficulty with ambulation.    ED course: On arrival to the ED, patient afebrile with temperature 99.1, tachycardic with heart rate 110, otherwise hemodynamically stable with SpO2 100% on room air.  Glucose 112, sodium 134, initial troponin negative.  Renal function WNL, D-dimer 385.  WBC 13.9 with slight left shift.  Hemoglobin/hematocrit WNL, platelets 197.  COVID, flu negative.  Chest x-ray shows well-defined opacity measuring 0.7 cm in medial aspect of RLL, results noted below.  CTA chest shows no pulmonary embolus, does show left lower lobe consolidation concerning for pneumonia.  Patient given 650 mg Tylenol, 1 L NS bolus, IV Rocephin, and IV azithromycin in  the ED.    EKG (interpreted by ED physician): Rate 110, normal QRS duration, no acute ischemic changes, no ST elevation    Admitting provider is Dr. Stewart     Past Medical History  Past Medical History:   Diagnosis Date    Allergic     Eczema     Headache     Streptococcal pharyngitis 06/28/2019    Pharyngitis due to group A beta hemolytic Streptococci     Surgical History  No past surgical history on file.     Social History  Denies tobacco use or smoking.  Admits to occasional social alcohol use.    Family History  Family History   Problem Relation Name Age of Onset    Anesthesia related problems Mother Saundra Hinton     Cancer Mother Saundra Hinton     Diabetes Mother Saundra Hinton     Hypertension Mother Saundra Hinton     Stroke Mother Saundra Hinton     Hypertension Father Luis Hinton     Diabetes Paternal Grandfather Mack Hinton     Heart disease Paternal Grandfather Mack Hinton     Breast cancer Paternal Grandmother Stephani Hinton     Diabetes Paternal Grandmother Stephani Hinton     Cancer Mother's Brother Bari Clements      Allergies  Eletriptan    Review of Systems  10 point view system negative except as noted above in HPI    Physical Exam  Constitutional:       Appearance: Normal appearance. She is obese.   HENT:      Head: Normocephalic and atraumatic.      Mouth/Throat:      Mouth: Mucous membranes are moist.      Pharynx: Oropharynx is clear.   Eyes:      Extraocular Movements: Extraocular movements intact.      Conjunctiva/sclera: Conjunctivae normal.      Pupils: Pupils are equal, round, and reactive to light.   Cardiovascular:      Rate and Rhythm: Regular rhythm. Tachycardia present.      Pulses: Normal pulses.      Heart sounds: Normal heart sounds.   Pulmonary:      Effort: Pulmonary effort is normal. No respiratory distress.      Breath sounds: Normal breath sounds. No wheezing.   Abdominal:      General: Bowel sounds are normal. There is no distension.      Palpations: Abdomen is soft.       Tenderness: There is no abdominal tenderness.   Musculoskeletal:         General: Normal range of motion.   Skin:     General: Skin is warm and dry.   Neurological:      General: No focal deficit present.      Mental Status: She is alert and oriented to person, place, and time.   Psychiatric:         Mood and Affect: Mood normal.         Behavior: Behavior normal.       Last Recorded Vitals  Blood pressure 153/80, pulse (!) 116, temperature 37.6 °C (99.7 °F), resp. rate 19, SpO2 99%.    Relevant Results  Results for orders placed or performed during the hospital encounter of 02/06/25 (from the past 24 hours)   CBC and Auto Differential   Result Value Ref Range    WBC 13.9 (H) 4.4 - 11.3 x10*3/uL    nRBC 0.0 0.0 - 0.0 /100 WBCs    RBC 4.79 4.00 - 5.20 x10*6/uL    Hemoglobin 13.8 12.0 - 16.0 g/dL    Hematocrit 41.1 36.0 - 46.0 %    MCV 86 80 - 100 fL    MCH 28.8 26.0 - 34.0 pg    MCHC 33.6 32.0 - 36.0 g/dL    RDW 12.8 11.5 - 14.5 %    Platelets 197 150 - 450 x10*3/uL    Neutrophils % 85.7 40.0 - 80.0 %    Immature Granulocytes %, Automated 0.4 0.0 - 0.9 %    Lymphocytes % 8.3 13.0 - 44.0 %    Monocytes % 5.0 2.0 - 10.0 %    Eosinophils % 0.4 0.0 - 6.0 %    Basophils % 0.2 0.0 - 2.0 %    Neutrophils Absolute 11.90 (H) 1.20 - 7.70 x10*3/uL    Immature Granulocytes Absolute, Automated 0.06 0.00 - 0.70 x10*3/uL    Lymphocytes Absolute 1.15 (L) 1.20 - 4.80 x10*3/uL    Monocytes Absolute 0.70 0.10 - 1.00 x10*3/uL    Eosinophils Absolute 0.05 0.00 - 0.70 x10*3/uL    Basophils Absolute 0.03 0.00 - 0.10 x10*3/uL   Comprehensive Metabolic Panel   Result Value Ref Range    Glucose 112 (H) 74 - 99 mg/dL    Sodium 134 (L) 136 - 145 mmol/L    Potassium 3.9 3.5 - 5.3 mmol/L    Chloride 105 98 - 107 mmol/L    Bicarbonate 22 21 - 32 mmol/L    Anion Gap 11 10 - 20 mmol/L    Urea Nitrogen 12 6 - 23 mg/dL    Creatinine 0.90 0.50 - 1.05 mg/dL    eGFR 85 >60 mL/min/1.73m*2    Calcium 9.0 8.6 - 10.3 mg/dL    Albumin 4.3 3.4 - 5.0 g/dL     Alkaline Phosphatase 93 33 - 110 U/L    Total Protein 7.7 6.4 - 8.2 g/dL    AST 17 9 - 39 U/L    Bilirubin, Total 0.5 0.0 - 1.2 mg/dL    ALT 28 7 - 45 U/L   Troponin I, High Sensitivity   Result Value Ref Range    Troponin I, High Sensitivity <3 0 - 13 ng/L   D-Dimer, VTE Exclusion   Result Value Ref Range    D-Dimer, Quantitative VTE Exclusion 385 <=500 ng/mL FEU   Human Chorionic Gonadotropin, Serum Quantitative   Result Value Ref Range    HCG, Beta-Quantitative <2 <5 mIU/mL   Sars-CoV-2 PCR   Result Value Ref Range    Coronavirus 2019, PCR Not Detected Not Detected   Influenza A, and B PCR   Result Value Ref Range    Flu A Result Not Detected Not Detected    Flu B Result Not Detected Not Detected      CT angio chest for pulmonary embolism    Result Date: 2/6/2025  STUDY: CT Angiogram of the Chest; 02/06/2025, 1:19 PM. INDICATION: Tachycardia. COMPARISON: Not available. ACCESSION NUMBER(S): [id number] ORDERING CLINICIAN: ANGELA ETIENNE TECHNIQUE:  CTA of the chest was performed with intravenous contrast. Images are reviewed and processed at a workstation according to the CT angiogram protocol with 3-D and/or MIP post processing imaging generated.  Omnipaque 350 75 mL was administered intravenously. Automated mA/kV exposure control was utilized and patient examination was performed in strict accordance with principles of ALARA. FINDINGS: Pulmonary arteries: No pulmonary embolus is identified. Lungs/Pleura: Left lower lobe nodularity has an appearance most suggestive of an infectious or inflammatory process. No pleural effusion. No pneumothorax. Patent central airways. Mediastinum: No enlarged lymph nodes. Thoracic aorta normal in caliber without evidence for dissection. Heart size normal. No pericardial effusion. No clear evidence for coronary artery calcification. Other: Imaging through the upper abdomen reveals no acute finding. No acute fracture.    *No pulmonary embolus is identified. *Left lower lobe  consolidation is concerning for pneumonia. Correlate clinically. *Please see above for more details.  Signed by Gerhard Rondon MD    XR chest 2 views    Result Date: 2/6/2025  Interpreted By:  Erickson Villa, STUDY: XR CHEST 2 VIEWS;  2/6/2025 10:21 am   INDICATION: Signs/Symptoms:chest pain.     COMPARISON: None.   ACCESSION NUMBER(S): [id number]   ORDERING CLINICIAN: ANGELA ETIENNE   FINDINGS: CARDIOMEDIASTINAL SILHOUETTE: Cardiomediastinal silhouette is normal in size and configuration.   LUNGS: No major pulmonary infiltrates. No pleural effusion or pneumothorax. Small opacity in the medial aspect of the right lower lobe measuring 0.7 cm.   ABDOMEN: No remarkable upper abdominal findings.   BONES: No acute osseous changes.       1. Small nearly well-defined opacity measuring 0.7 cm in the medial aspect of the right lower lobe. This could be related to crowded shadows, however underlying nodule or small infiltrate could not be entirely excluded. Otherwise no major infiltrate or pleural effusion. Advise follow-up in 4-6 weeks to ensure resolution.       MACRO: None   Signed by: Erickson Villa 2/6/2025 10:28 AM Dictation workstation:   [id number]       Assessment/Plan   Assessment & Plan  Community acquired pneumonia of left lower lobe of lung      Stephanie Paige is a 39 y.o. female presents to the ED with complaints of a chest heaviness.  Describes it as a tight sensation in the middle of her upper chest, where it feels like someone is sitting on the middle of her chest.  Denies any stabbing pains. Does admit to occasional chest pressure with anxiety in the past, however states this has been going on for the past couple weeks and feels like it is getting worse.  Does report increased stress recently and also a viral upper respiratory type illness that she has been dealing with.  Reports nonproductive cough, mild intermittent headache, and congestion.  Has not taken any medications at home for her  symptoms.  Denies any shortness of breath.     CODE STATUS: Full code    #Left lower lobe pneumonia  #Chest pressure  #Leukocytosis, WBC 13.9  #Tachycardia  #Cough, congestion  Admit for observation with telemetry monitoring  COVID, flu negative.  Hemodynamically stable.  No hypoxia, SpO2 99% on room air.  CTA chest shows no PE.  Does show left lower lobe consolidation concerning for pneumonia  IV Rocephin, IV azithromycin daily  Breathing treatments, oxygen, benzonatate as needed  Tylenol, Zofran as needed  Q 4 vitals  CBC, BMP in a.m.  Regular diet  Urine pneumonia antigens ordered  Procalcitonin in the a.m.    -Most recent vitals at 1524, temperature 101.8 °F.  Increased from initial temperature on admission of 99.1 °F.  Tylenol ordered and blood cultures ordered.    #0.7 cm lung nodule in medial aspect RLL, noted incidentally on chest x-ray  Referral to lung nodule clinic for follow-up/monitoring    #Mild hyponatremia  Monitor with a.m. labs  Patient given 1 L NS bolus in the ED    Continue home medications as appropriate    Chronic conditions:  Obesity, anxiety    #DVT prophylaxis  SCDs, ambulation       I spent 60 minutes in the professional and overall care of this patient.    Hermelindo Barroso PA-C

## 2025-02-06 NOTE — ED TRIAGE NOTES
PT PRESENTS TO ED VIA POV FROM HOME FOR INTERMITTENT CP X FEW WEEKS. PT STATES CP HAS BECOME MORE PERSISTENT AND WAS CONCERNED WHEN CP WOKE HER UP OUT OF HER SLEEP LAST NIGHT. CP IS MID STERNAL WITHOUT RADIATION. PAIN IS DULL IN NATURE.

## 2025-02-07 ENCOUNTER — PHARMACY VISIT (OUTPATIENT)
Dept: PHARMACY | Facility: CLINIC | Age: 40
End: 2025-02-07
Payer: COMMERCIAL

## 2025-02-07 VITALS
SYSTOLIC BLOOD PRESSURE: 114 MMHG | BODY MASS INDEX: 45.18 KG/M2 | WEIGHT: 255 LBS | RESPIRATION RATE: 18 BRPM | HEIGHT: 63 IN | HEART RATE: 96 BPM | TEMPERATURE: 97.2 F | OXYGEN SATURATION: 96 % | DIASTOLIC BLOOD PRESSURE: 64 MMHG

## 2025-02-07 PROBLEM — J15.9 COMMUNITY ACQUIRED BACTERIAL PNEUMONIA: Status: ACTIVE | Noted: 2025-02-07

## 2025-02-07 LAB
ANION GAP SERPL CALC-SCNC: 13 MMOL/L (ref 10–20)
ATRIAL RATE: 111 BPM
BUN SERPL-MCNC: 9 MG/DL (ref 6–23)
CALCIUM SERPL-MCNC: 8.8 MG/DL (ref 8.6–10.3)
CHLORIDE SERPL-SCNC: 106 MMOL/L (ref 98–107)
CO2 SERPL-SCNC: 20 MMOL/L (ref 21–32)
CREAT SERPL-MCNC: 0.85 MG/DL (ref 0.5–1.05)
EGFRCR SERPLBLD CKD-EPI 2021: >90 ML/MIN/1.73M*2
ERYTHROCYTE [DISTWIDTH] IN BLOOD BY AUTOMATED COUNT: 13.2 % (ref 11.5–14.5)
GLUCOSE SERPL-MCNC: 112 MG/DL (ref 74–99)
HCT VFR BLD AUTO: 39 % (ref 36–46)
HGB BLD-MCNC: 12.9 G/DL (ref 12–16)
LEGIONELLA AG UR QL: NEGATIVE
MCH RBC QN AUTO: 28.6 PG (ref 26–34)
MCHC RBC AUTO-ENTMCNC: 33.1 G/DL (ref 32–36)
MCV RBC AUTO: 87 FL (ref 80–100)
NRBC BLD-RTO: 0 /100 WBCS (ref 0–0)
P AXIS: 48 DEGREES
PLATELET # BLD AUTO: 216 X10*3/UL (ref 150–450)
POTASSIUM SERPL-SCNC: 3.8 MMOL/L (ref 3.5–5.3)
PR INTERVAL: 141 MS
PROCALCITONIN SERPL-MCNC: 0.08 NG/ML
Q ONSET: 253 MS
QRS COUNT: 17 BEATS
QRS DURATION: 89 MS
QT INTERVAL: 325 MS
QTC CALCULATION(BAZETT): 440 MS
QTC FREDERICIA: 398 MS
R AXIS: 12 DEGREES
RBC # BLD AUTO: 4.51 X10*6/UL (ref 4–5.2)
S PNEUM AG UR QL: NEGATIVE
SODIUM SERPL-SCNC: 135 MMOL/L (ref 136–145)
T AXIS: 4 DEGREES
T OFFSET: 415 MS
VENTRICULAR RATE: 110 BPM
WBC # BLD AUTO: 15.5 X10*3/UL (ref 4.4–11.3)

## 2025-02-07 PROCEDURE — 85027 COMPLETE CBC AUTOMATED: CPT | Performed by: PHYSICIAN ASSISTANT

## 2025-02-07 PROCEDURE — 1200000002 HC GENERAL ROOM WITH TELEMETRY DAILY

## 2025-02-07 PROCEDURE — 84145 PROCALCITONIN (PCT): CPT | Mod: PARLAB | Performed by: PHYSICIAN ASSISTANT

## 2025-02-07 PROCEDURE — 36415 COLL VENOUS BLD VENIPUNCTURE: CPT | Performed by: PHYSICIAN ASSISTANT

## 2025-02-07 PROCEDURE — 2500000001 HC RX 250 WO HCPCS SELF ADMINISTERED DRUGS (ALT 637 FOR MEDICARE OP): Performed by: PHYSICIAN ASSISTANT

## 2025-02-07 PROCEDURE — RXMED WILLOW AMBULATORY MEDICATION CHARGE

## 2025-02-07 PROCEDURE — 2500000002 HC RX 250 W HCPCS SELF ADMINISTERED DRUGS (ALT 637 FOR MEDICARE OP, ALT 636 FOR OP/ED): Performed by: PHYSICIAN ASSISTANT

## 2025-02-07 PROCEDURE — 80048 BASIC METABOLIC PNL TOTAL CA: CPT | Performed by: PHYSICIAN ASSISTANT

## 2025-02-07 PROCEDURE — 99223 1ST HOSP IP/OBS HIGH 75: CPT | Performed by: STUDENT IN AN ORGANIZED HEALTH CARE EDUCATION/TRAINING PROGRAM

## 2025-02-07 RX ORDER — LEVOFLOXACIN 500 MG/1
500 TABLET, FILM COATED ORAL DAILY
Qty: 10 TABLET | Refills: 0 | Status: SHIPPED | OUTPATIENT
Start: 2025-02-07 | End: 2025-02-17

## 2025-02-07 RX ORDER — BENZONATATE 100 MG/1
100 CAPSULE ORAL 3 TIMES DAILY PRN
Qty: 20 CAPSULE | Refills: 0 | Status: SHIPPED | OUTPATIENT
Start: 2025-02-07

## 2025-02-07 RX ADMIN — TOPIRAMATE 50 MG: 25 TABLET, FILM COATED ORAL at 08:13

## 2025-02-07 RX ADMIN — CETIRIZINE HYDROCHLORIDE 10 MG: 10 TABLET, FILM COATED ORAL at 08:13

## 2025-02-07 RX ADMIN — SERTRALINE HYDROCHLORIDE 25 MG: 25 TABLET ORAL at 08:13

## 2025-02-07 ASSESSMENT — COGNITIVE AND FUNCTIONAL STATUS - GENERAL
MOBILITY SCORE: 22
CLIMB 3 TO 5 STEPS WITH RAILING: A LITTLE
DAILY ACTIVITIY SCORE: 24
WALKING IN HOSPITAL ROOM: A LITTLE

## 2025-02-07 ASSESSMENT — PAIN SCALES - GENERAL: PAINLEVEL_OUTOF10: 0 - NO PAIN

## 2025-02-07 NOTE — CARE PLAN
Problem: Pain - Adult  Goal: Verbalizes/displays adequate comfort level or baseline comfort level  Outcome: Progressing     Problem: Safety - Adult  Goal: Free from fall injury  Outcome: Progressing     Problem: Chronic Conditions and Co-morbidities  Goal: Patient's chronic conditions and co-morbidity symptoms are monitored and maintained or improved  Outcome: Progressing     Problem: Nutrition  Goal: Nutrient intake appropriate for maintaining nutritional needs  Outcome: Progressing     Problem: Discharge Planning  Goal: Discharge to home or other facility with appropriate resources  Outcome: Progressing

## 2025-02-07 NOTE — CARE PLAN
The patient's goals for the shift include      The clinical goals for the shift include patient not to have chest pain throughout the shift    Over the shift, the patient did not make progress toward the following goals. Barriers to progression include chest pain. Recommendations to address these barriers include monitoring.

## 2025-02-07 NOTE — NURSING NOTE
Met with patient at the bedside. Discharge Planning discussed. AVS updated with follow-ups, education, and medication information. Verified/ entered a PCP and pharmacy. New medications and side effects discussed. Discussed and demonstrated Incentive Spirometer. Discussed follow up in [facility]. All questions answered.  Updated nurse on this info. AVS printed and hi-lighted. IV and tele removed. Waiting on meds to beds.

## 2025-02-07 NOTE — PROGRESS NOTES
02/07/25 1016   Discharge Planning   Living Arrangements Spouse/significant other   Support Systems Spouse/significant other   Assistance Needed independent   Type of Residence Private residence  (2 level home)   Number of Stairs to Enter Residence 1   Number of Stairs Within Residence 10   Home or Post Acute Services None   Expected Discharge Disposition Home   Intensity of Service   Intensity of Service 0-30 min     Care transitions at bedside to complete assessment.  TCC introduced self and explained role.  Patient demographics reviewed and verified.  Patient stated that she has been independent at home.  Stated that she works and drives.  Stated that her plan is to drive herself home from the hospital.  Denies home going needs at this time.  Care transitions to follow.    PCP: Cassi Dailey  Insurance: [facility]  Pharmacy: [facility] at the [address] of [address]

## 2025-02-09 LAB
BACTERIA BLD CULT: NORMAL
BACTERIA BLD CULT: NORMAL

## 2025-02-10 NOTE — DISCHARGE SUMMARY
Discharge Diagnosis  Community acquired pneumonia of left lower lobe of lung    Issues Requiring Follow-Up  cap    Test Results Pending At Discharge  Pending Labs       Order Current Status    Blood Culture Preliminary result    Blood Culture Preliminary result            Hospital Course   Stephanie Paige is a 39 y.o. female presents to the ED with complaints of a chest heaviness.  Describes it as a tight sensation in the middle of her upper chest, where it feels like someone is sitting on the middle of her chest.  Denies any stabbing pains. Does admit to occasional chest pressure with anxiety in the past, however states this has been going on for the past couple weeks and feels like it is getting worse.  Does report increased stress recently and also a viral upper respiratory type illness that she has been dealing with.  Reports nonproductive cough, mild intermittent headache, and congestion.  Has not taken any medications at home for her symptoms.  Denies any shortness of breath.      CODE STATUS: Full code     #Left lower lobe pneumonia  #Chest pressure  #Leukocytosis, WBC 13.9  #Tachycardia  #Cough, congestion  Admit for observation with telemetry monitoring  COVID, flu negative.  Hemodynamically stable.  No hypoxia, SpO2 99% on room air.  CTA chest shows no PE.  Does show left lower lobe consolidation concerning for pneumonia  IV Rocephin, IV azithromycin daily  Breathing treatments, oxygen, benzonatate as needed  Tylenol, Zofran as needed  Q 4 vitals  CBC, BMP in a.m.  Regular diet  Urine pneumonia antigens ordered  Procalcitonin in the a.m.     -Most recent vitals at 1524, temperature 101.8 °F.  Increased from initial temperature on admission of 99.1 °F.  Tylenol ordered and blood cultures ordered.     #0.7 cm lung nodule in medial aspect RLL, noted incidentally on chest x-ray  Referral to lung nodule clinic for follow-up/monitoring     #Mild hyponatremia  Monitor with a.m. labs  Patient given 1 L NS  bolus in the ED     Continue home medications as appropriate     Chronic conditions:  Obesity, anxiety     #DVT prophylaxis  SCDs, ambulation  2/7: feeling much better ok to dc home       Pertinent Physical Exam At Time of Discharge  Physical Exam  Constitutional:       Appearance: Normal appearance.   HENT:      Head: Normocephalic and atraumatic.      Right Ear: Tympanic membrane and ear canal normal.      Left Ear: Tympanic membrane and ear canal normal.      Mouth/Throat:      Mouth: Mucous membranes are moist.      Pharynx: Oropharynx is clear.   Eyes:      Extraocular Movements: Extraocular movements intact.      Conjunctiva/sclera: Conjunctivae normal.      Pupils: Pupils are equal, round, and reactive to light.   Cardiovascular:      Rate and Rhythm: Normal rate and regular rhythm.      Pulses: Normal pulses.      Heart sounds: Normal heart sounds.   Pulmonary:      Effort: Pulmonary effort is normal.      Breath sounds: Normal breath sounds.   Abdominal:      General: Abdomen is flat. Bowel sounds are normal.      Palpations: Abdomen is soft.   Musculoskeletal:         General: Normal range of motion.      Cervical back: Normal range of motion and neck supple.   Skin:     General: Skin is warm and dry.      Capillary Refill: Capillary refill takes 2 to 3 seconds.   Neurological:      General: No focal deficit present.      Mental Status: She is alert and oriented to person, place, and time. Mental status is at baseline.   Psychiatric:         Mood and Affect: Mood normal.         Behavior: Behavior normal.         Thought Content: Thought content normal.         Judgment: Judgment normal.         Home Medications     Medication List      START taking these medications     benzonatate 100 mg capsule; Commonly known as: Tessalon; Take 1 capsule   (100 mg) by mouth 3 times a day as needed for cough. Do not crush or chew.   levoFLOXacin 500 mg tablet; Commonly known as: Levaquin; Take 1 tablet   (500 mg) by  mouth once daily for 10 days.     CONTINUE taking these medications     busPIRone 5 mg tablet; Commonly known as: Buspar; Take 1 tablet (5 mg)   by mouth 2 times a day.   clobetasol 0.05 % cream; Commonly known as: Temovate; Apply topically 2   times a day.   dexAMETHasone 4 mg tablet; Commonly known as: Decadron; Take 1 tablet (4   mg) by mouth 2 times a day for 4 days.   fluticasone 50 mcg/actuation nasal spray; Commonly known as: Flonase;   Administer 1 spray into each nostril once daily. Shake gently. Before   first use, prime pump. After use, clean tip and replace cap.   loratadine 10 mg tablet; Commonly known as: Claritin; TAKE 1 TABLET(10   MG) BY MOUTH DAILY   sertraline 25 mg tablet; Commonly known as: Zoloft; Take 1 tablet (25   mg) by mouth once daily.   topiramate 50 mg tablet; Commonly known as: Topamax; Take 1 tablet (50   mg) by mouth 2 times a day.       Outpatient Follow-Up  No future appointments.    Josiah Stewart, DO

## 2025-02-11 ENCOUNTER — PATIENT OUTREACH (OUTPATIENT)
Dept: PRIMARY CARE | Facility: CLINIC | Age: 40
End: 2025-02-11
Payer: COMMERCIAL

## 2025-02-11 ENCOUNTER — TELEPHONE (OUTPATIENT)
Dept: PRIMARY CARE | Facility: CLINIC | Age: 40
End: 2025-02-11
Payer: COMMERCIAL

## 2025-02-11 LAB
BACTERIA BLD CULT: NORMAL
BACTERIA BLD CULT: NORMAL

## 2025-02-11 NOTE — TELEPHONE ENCOUNTER
----- Message from Kristina Pleitez sent at 2/11/2025 10:27 AM EST -----  Regarding: TCM appt needed  Discharge facility:[facility]  Discharge diagnosis:Community acquired pneumonia     Date of discharge:2/7/25        Unsuccessful attempts x2 to reach patient for PCP Follow-up  Please have office staff reach out to patient and schedule an appointment within 14 days from discharge date.    Needs to be seen by 2/20/25

## 2025-02-11 NOTE — TELEPHONE ENCOUNTER
----- Message from Cassi ARIAS sent at 2/11/2025 11:20 AM EST -----  Regarding: FW: TCM appt needed  Can you help schedule  ----- Message -----  From: Will Nunez MA  Sent: 2/11/2025  10:44 AM EST  To: Cassi Zavala MA  Subject: FW: TCM appt needed                                ----- Message -----  From: Kristina Pleitez LPN  Sent: 2/11/2025  10:29 AM EST  To:  [place] [id number] Clinical Support Staff; #  Subject: TCM appt needed                                  Discharge facility:[facility]  Discharge diagnosis:Community acquired pneumonia     Date of discharge:2/7/25        Unsuccessful attempts x2 to reach patient for PCP Follow-up  Please have office staff reach out to patient and schedule an appointment within 14 days from discharge date.    Needs to be seen by 2/20/25

## 2025-02-11 NOTE — PROGRESS NOTES
Discharge Facility:[facility]  Discharge Diagnosis:Community acquired pneumonia  Admission Date:2/6/25  Discharge Date: 2/7/25    PCP Appointment Date:No contact made. Message sent to office.  Specialist Appointment Date: TBD  Hospital Encounter and Summary Linked: Yes    2 call attempts made

## 2025-02-24 ENCOUNTER — APPOINTMENT (OUTPATIENT)
Dept: PRIMARY CARE | Facility: CLINIC | Age: 40
End: 2025-02-24
Payer: COMMERCIAL

## 2025-02-24 ENCOUNTER — HOSPITAL ENCOUNTER (OUTPATIENT)
Dept: RADIOLOGY | Facility: HOSPITAL | Age: 40
Discharge: HOME | End: 2025-02-24
Payer: COMMERCIAL

## 2025-02-24 VITALS
BODY MASS INDEX: 45.18 KG/M2 | OXYGEN SATURATION: 98 % | HEIGHT: 63 IN | HEART RATE: 95 BPM | WEIGHT: 255 LBS | DIASTOLIC BLOOD PRESSURE: 70 MMHG | SYSTOLIC BLOOD PRESSURE: 110 MMHG

## 2025-02-24 DIAGNOSIS — R06.02 SOB (SHORTNESS OF BREATH): Primary | ICD-10-CM

## 2025-02-24 DIAGNOSIS — R06.02 SOB (SHORTNESS OF BREATH): ICD-10-CM

## 2025-02-24 DIAGNOSIS — J18.9 PNEUMONIA OF LEFT LUNG DUE TO INFECTIOUS ORGANISM, UNSPECIFIED PART OF LUNG: ICD-10-CM

## 2025-02-24 DIAGNOSIS — K21.9 GASTROESOPHAGEAL REFLUX DISEASE WITHOUT ESOPHAGITIS: ICD-10-CM

## 2025-02-24 PROBLEM — J02.9 SORE THROAT: Status: RESOLVED | Noted: 2025-02-24 | Resolved: 2025-02-24

## 2025-02-24 PROBLEM — R42 DIZZINESS AND GIDDINESS: Status: ACTIVE | Noted: 2023-11-01

## 2025-02-24 PROBLEM — R05.9 COUGH: Status: ACTIVE | Noted: 2025-02-24

## 2025-02-24 PROBLEM — L30.9 ECZEMA: Status: ACTIVE | Noted: 2025-02-24

## 2025-02-24 PROBLEM — Z20.822 SUSPECTED COVID-19 VIRUS INFECTION: Status: RESOLVED | Noted: 2025-02-24 | Resolved: 2025-02-24

## 2025-02-24 PROBLEM — J02.9 ACUTE PHARYNGITIS: Status: RESOLVED | Noted: 2025-02-24 | Resolved: 2025-02-24

## 2025-02-24 PROBLEM — H65.191 ACUTE MUCOID OTITIS MEDIA OF RIGHT EAR: Status: ACTIVE | Noted: 2025-02-24

## 2025-02-24 PROBLEM — Z20.822 SUSPECTED SEVERE ACUTE RESPIRATORY SYNDROME CORONAVIRUS 2 (SARS-COV-2) INFECTION: Status: RESOLVED | Noted: 2025-02-24 | Resolved: 2025-02-24

## 2025-02-24 PROBLEM — J02.0 STREP PHARYNGITIS: Status: RESOLVED | Noted: 2025-02-24 | Resolved: 2025-02-24

## 2025-02-24 PROBLEM — J02.0 PHARYNGITIS DUE TO GROUP A BETA HEMOLYTIC STREPTOCOCCI: Status: RESOLVED | Noted: 2025-02-24 | Resolved: 2025-02-24

## 2025-02-24 PROCEDURE — 71046 X-RAY EXAM CHEST 2 VIEWS: CPT | Performed by: RADIOLOGY

## 2025-02-24 PROCEDURE — 3008F BODY MASS INDEX DOCD: CPT | Performed by: NURSE PRACTITIONER

## 2025-02-24 PROCEDURE — 99214 OFFICE O/P EST MOD 30 MIN: CPT | Performed by: NURSE PRACTITIONER

## 2025-02-24 PROCEDURE — 1036F TOBACCO NON-USER: CPT | Performed by: NURSE PRACTITIONER

## 2025-02-24 PROCEDURE — 71046 X-RAY EXAM CHEST 2 VIEWS: CPT

## 2025-02-24 RX ORDER — PANTOPRAZOLE SODIUM 40 MG/1
40 TABLET, DELAYED RELEASE ORAL
Qty: 14 TABLET | Refills: 0 | Status: SHIPPED | OUTPATIENT
Start: 2025-02-24 | End: 2025-03-10

## 2025-02-24 ASSESSMENT — ENCOUNTER SYMPTOMS
WOUND: 0
POLYPHAGIA: 0
NEUROLOGICAL NEGATIVE: 1
CARDIOVASCULAR NEGATIVE: 1
GASTROINTESTINAL NEGATIVE: 1
HEMATOLOGIC/LYMPHATIC NEGATIVE: 1
SHORTNESS OF BREATH: 1
DIZZINESS: 0
WEAKNESS: 0
PSYCHIATRIC NEGATIVE: 1
MUSCULOSKELETAL NEGATIVE: 1
EYES NEGATIVE: 1
CONSTITUTIONAL NEGATIVE: 1
ENDOCRINE NEGATIVE: 1
FACIAL ASYMMETRY: 0
CHEST TIGHTNESS: 1
ALLERGIC/IMMUNOLOGIC NEGATIVE: 1
LIGHT-HEADEDNESS: 0
NERVOUS/ANXIOUS: 0
SLEEP DISTURBANCE: 0
CONFUSION: 0

## 2025-02-24 ASSESSMENT — PATIENT HEALTH QUESTIONNAIRE - PHQ9
1. LITTLE INTEREST OR PLEASURE IN DOING THINGS: NOT AT ALL
2. FEELING DOWN, DEPRESSED OR HOPELESS: NOT AT ALL
SUM OF ALL RESPONSES TO PHQ9 QUESTIONS 1 AND 2: 0

## 2025-02-24 NOTE — PROGRESS NOTES
"Subjective   Patient ID: Stephanie Paige is a 39 y.o. female who presents for Hospital Follow-up (Hospital follow up- was in the hospital for pneumonia /Still feeling some pressure in her chest and not sure if its anxiety or still from her sickness).    HPI   Stephanie Is here for pneumonia follow up   She was admitted from 2/6- 2/7 from [facility]   She still feels mid sternal non radiation chest discomfort     CXR shows:   IMPRESSION:  1. Small nearly well-defined opacity measuring 0.7 cm in the medial  aspect of the right lower lobe. This could be related to crowded  shadows, however underlying nodule or small infiltrate could not be  entirely excluded. Otherwise no major infiltrate or pleural effusion.  Advise follow-up in 4-6 weeks to ensure resolution.    CT chest shows:   IMPRESSION:  *No pulmonary embolus is identified.  *Left lower lobe consolidation is concerning for pneumonia.      We will start low dose PPI x 14 days   -> and complete  repeat CXR     Review of Systems   Constitutional: Negative.    HENT: Negative.     Eyes: Negative.    Respiratory:  Positive for chest tightness and shortness of breath.    Cardiovascular: Negative.    Gastrointestinal: Negative.    Endocrine: Negative.  Negative for polyphagia.   Genitourinary: Negative.    Musculoskeletal: Negative.    Skin: Negative.  Negative for pallor, rash and wound.   Allergic/Immunologic: Negative.    Neurological: Negative.  Negative for dizziness, facial asymmetry, weakness and light-headedness.   Hematological: Negative.    Psychiatric/Behavioral: Negative.  Negative for confusion, sleep disturbance and suicidal ideas. The patient is not nervous/anxious.    All other systems reviewed and are negative.      Objective   /70 (BP Location: Right arm, Patient Position: Sitting, BP Cuff Size: Large adult)   Pulse 95   Ht 1.6 m (5' 3\")   Wt 116 kg (255 lb)   SpO2 98%   BMI 45.17 kg/m²     Physical Exam  Vitals and nursing note " reviewed.   Constitutional:       Appearance: Normal appearance. She is normal weight.   HENT:      Head: Normocephalic.      Nose: Nose normal.      Mouth/Throat:      Mouth: Mucous membranes are moist.   Eyes:      Extraocular Movements: Extraocular movements intact.      Conjunctiva/sclera: Conjunctivae normal.      Pupils: Pupils are equal, round, and reactive to light.   Cardiovascular:      Rate and Rhythm: Normal rate and regular rhythm.      Pulses: Normal pulses.      Heart sounds: Normal heart sounds.   Pulmonary:      Effort: Pulmonary effort is normal.   Abdominal:      General: Abdomen is flat. Bowel sounds are normal.      Palpations: Abdomen is soft.   Musculoskeletal:         General: Normal range of motion.      Cervical back: Normal range of motion.   Skin:     General: Skin is warm and dry.   Neurological:      General: No focal deficit present.      Mental Status: She is alert and oriented to person, place, and time.   Psychiatric:         Mood and Affect: Mood normal.         Behavior: Behavior normal.         Assessment/Plan   1. SOB (shortness of breath) (Primary)  - XR chest 2 views; Future    2. Pneumonia of left lung due to infectious organism, unspecified part of lung  - XR chest 2 views; Future    3. Gastroesophageal reflux disease without esophagitis  - pantoprazole (ProtoNix) 40 mg EC tablet; Take 1 tablet (40 mg) by mouth once daily in the morning. Take before meals for 14 days. Do not crush, chew, or split.  Dispense: 14 tablet; Refill: 0

## 2025-02-25 ENCOUNTER — PATIENT OUTREACH (OUTPATIENT)
Dept: PRIMARY CARE | Facility: CLINIC | Age: 40
End: 2025-02-25
Payer: COMMERCIAL

## 2025-02-26 DIAGNOSIS — Z86.19 HX OF BACTERIAL INFECTION: Primary | ICD-10-CM

## 2025-02-26 DIAGNOSIS — R91.1 LUNG NODULE: ICD-10-CM

## 2025-02-26 LAB
ATRIAL RATE: 111 BPM
P AXIS: 48 DEGREES
PR INTERVAL: 141 MS
Q ONSET: 253 MS
QRS COUNT: 17 BEATS
QRS DURATION: 89 MS
QT INTERVAL: 325 MS
QTC CALCULATION(BAZETT): 440 MS
QTC FREDERICIA: 398 MS
R AXIS: 12 DEGREES
T AXIS: 4 DEGREES
T OFFSET: 415 MS
VENTRICULAR RATE: 110 BPM

## 2025-03-26 ENCOUNTER — OFFICE VISIT (OUTPATIENT)
Dept: URGENT CARE | Age: 40
End: 2025-03-26
Payer: COMMERCIAL

## 2025-03-26 VITALS
HEART RATE: 89 BPM | DIASTOLIC BLOOD PRESSURE: 71 MMHG | TEMPERATURE: 98.4 F | OXYGEN SATURATION: 96 % | RESPIRATION RATE: 15 BRPM | SYSTOLIC BLOOD PRESSURE: 104 MMHG

## 2025-03-26 DIAGNOSIS — J02.0 STREP PHARYNGITIS: ICD-10-CM

## 2025-03-26 DIAGNOSIS — R68.89 FLU-LIKE SYMPTOMS: Primary | ICD-10-CM

## 2025-03-26 DIAGNOSIS — B34.8 RHINOVIRUS: ICD-10-CM

## 2025-03-26 LAB
POC HUMAN RHINOVIRUS PCR: POSITIVE
POC INFLUENZA A VIRUS PCR: NEGATIVE
POC INFLUENZA B VIRUS PCR: NEGATIVE
POC RESPIRATORY SYNCYTIAL VIRUS PCR: NEGATIVE
POC STREPTOCOCCUS PYOGENES (GROUP A STREP) PCR: POSITIVE

## 2025-03-26 PROCEDURE — 87631 RESP VIRUS 3-5 TARGETS: CPT | Performed by: NURSE PRACTITIONER

## 2025-03-26 PROCEDURE — 99213 OFFICE O/P EST LOW 20 MIN: CPT | Performed by: NURSE PRACTITIONER

## 2025-03-26 PROCEDURE — 87651 STREP A DNA AMP PROBE: CPT | Performed by: NURSE PRACTITIONER

## 2025-03-26 PROCEDURE — 1036F TOBACCO NON-USER: CPT | Performed by: NURSE PRACTITIONER

## 2025-03-26 RX ORDER — AMOXICILLIN 500 MG/1
500 CAPSULE ORAL EVERY 12 HOURS SCHEDULED
Qty: 20 CAPSULE | Refills: 0 | Status: SHIPPED | OUTPATIENT
Start: 2025-03-26 | End: 2025-04-05

## 2025-03-26 ASSESSMENT — ENCOUNTER SYMPTOMS
FATIGUE: 1
RHINORRHEA: 1
COUGH: 1
CHILLS: 1
SORE THROAT: 1
DIARRHEA: 0
HEADACHES: 1
FEVER: 1
NAUSEA: 0
VOMITING: 0

## 2025-03-26 NOTE — PROGRESS NOTES
Subjective   Patient ID: Stephanie Paige is a 40 y.o. female. They present today with a chief complaint of Sore Throat, Cough, Headache, and Sinusitis (Sick X 5 days. TD-MA).    History of Present Illness  Patient presents with concern for 5-day history of cold/sinus symptoms. Endorses ibuprofen for symptoms.         Sore Throat   Associated symptoms include congestion, coughing and headaches. Pertinent negatives include no diarrhea or vomiting.   Cough  Associated symptoms include chills, a fever, headaches, postnasal drip, rhinorrhea and a sore throat.   Headache  Associated symptoms: congestion, cough, drainage, fatigue, fever and sore throat    Associated symptoms: no diarrhea, no nausea and no vomiting    Sinusitis  Associated symptoms: chills, congestion, cough, fatigue, fever, headaches, rhinorrhea and sore throat    Associated symptoms: no nausea and no vomiting        Past Medical History  Allergies as of 03/26/2025 - Reviewed 03/26/2025   Allergen Reaction Noted    Eletriptan Hives and Shortness of breath 05/21/2017       (Not in a hospital admission)       Past Medical History:   Diagnosis Date    Acute pharyngitis 02/24/2025    Allergic     Eczema     Headache     Pharyngitis due to group A beta hemolytic Streptococci 02/24/2025    Streptococcal pharyngitis 06/28/2019    Pharyngitis due to group A beta hemolytic Streptococci    Suspected COVID-19 virus infection 02/24/2025    Suspected severe acute respiratory syndrome coronavirus 2 (SARS-CoV-2) infection 02/24/2025       History reviewed. No pertinent surgical history.     reports that she has never smoked. She has been exposed to tobacco smoke. She has never used smokeless tobacco. She reports current alcohol use of about 1.0 standard drink of alcohol per week. She reports that she does not use drugs.    Review of Systems  Review of Systems   Constitutional:  Positive for chills, fatigue and fever.   HENT:  Positive for congestion, postnasal drip,  rhinorrhea and sore throat.    Respiratory:  Positive for cough.    Gastrointestinal:  Negative for diarrhea, nausea and vomiting.   Neurological:  Positive for headaches.                                  Objective    Vitals:    03/26/25 1510   BP: 104/71   Pulse: 89   Resp: 15   Temp: 36.9 °C (98.4 °F)   SpO2: 96%     No LMP recorded.    Physical Exam  Vitals reviewed.   Constitutional:       Appearance: Normal appearance.   HENT:      Right Ear: Tympanic membrane and ear canal normal.      Left Ear: Tympanic membrane and ear canal normal.      Mouth/Throat:      Mouth: Mucous membranes are moist.      Pharynx: Oropharynx is clear.   Cardiovascular:      Rate and Rhythm: Normal rate and regular rhythm.   Pulmonary:      Effort: Pulmonary effort is normal.      Breath sounds: Normal breath sounds.   Skin:     General: Skin is warm and dry.         Procedures    Point of Care Test & Imaging Results from this visit  Results for orders placed or performed in visit on 03/26/25   POCT SPOTFIRE R/ST Panel Mini w/Strep A ([employer]) manually resulted   Result Value Ref Range    POC Group A Strep, PCR Positive (A) Negative    POC Respiratory Syncytial Virus PCR Negative Negative    POC Influenza A Virus PCR Negative Negative    POC Influenza B Virus PCR Negative Negative    POC Human Rhinovirus PCR Positive (A) Negative      Imaging  No results found.    Cardiology, Vascular, and Other Imaging  No other imaging results found for the past 2 days      Diagnostic study results (if any) were reviewed by MAC Ahuja.    Assessment/Plan   Allergies, medications, history, and pertinent labs/EKGs/Imaging reviewed by MAC Ahuja.     Medical Decision Making  Continue supportive measures, and symptom management. Provided prescription for antibiotic. F/u if s/s increase or worsen.     At time of discharge patient was clinically well-appearing and HDS for outpatient management. The patient and/or family was  educated regarding diagnosis, supportive care, OTC and Rx medications. The patient and/or family was given the opportunity to ask questions prior to discharge.  They verbalized understanding of my discussion of the plans for treatment, expected course, indications to return to  or seek further evaluation in ED, and the need for timely follow up as directed.   They were provided with a work/school excuse if requested.      Orders and Diagnoses  Diagnoses and all orders for this visit:  Flu-like symptoms  -     POCT SPOTFIRE R/ST Panel Mini w/Strep A ([facility]) manually resulted  Strep pharyngitis  -     amoxicillin (Amoxil) 500 mg capsule; Take 1 capsule (500 mg) by mouth every 12 hours for 10 days.  Rhinovirus      Medical Admin Record      Patient disposition: Home    Electronically signed by MAC Ahuja  3:35 PM

## 2025-04-11 DIAGNOSIS — F41.9 ANXIETY: ICD-10-CM

## 2025-04-11 RX ORDER — SERTRALINE HYDROCHLORIDE 25 MG/1
25 TABLET, FILM COATED ORAL DAILY
Qty: 90 TABLET | Refills: 3 | Status: SHIPPED | OUTPATIENT
Start: 2025-04-11

## 2025-08-07 DIAGNOSIS — T78.40XA ALLERGY, INITIAL ENCOUNTER: ICD-10-CM

## 2025-08-08 RX ORDER — FLUTICASONE PROPIONATE 50 MCG
SPRAY, SUSPENSION (ML) NASAL
Qty: 16 G | Refills: 5 | Status: SHIPPED | OUTPATIENT
Start: 2025-08-08